# Patient Record
Sex: MALE | Race: WHITE | NOT HISPANIC OR LATINO | Employment: OTHER | URBAN - METROPOLITAN AREA
[De-identification: names, ages, dates, MRNs, and addresses within clinical notes are randomized per-mention and may not be internally consistent; named-entity substitution may affect disease eponyms.]

---

## 2023-11-09 ENCOUNTER — TELEPHONE (OUTPATIENT)
Dept: ORTHOPEDIC SURGERY | Facility: CLINIC | Age: 48
End: 2023-11-09
Payer: COMMERCIAL

## 2023-11-13 ENCOUNTER — APPOINTMENT (OUTPATIENT)
Dept: ORTHOPEDIC SURGERY | Facility: CLINIC | Age: 48
End: 2023-11-13
Payer: COMMERCIAL

## 2023-11-15 ENCOUNTER — APPOINTMENT (OUTPATIENT)
Dept: ORTHOPEDIC SURGERY | Facility: CLINIC | Age: 48
End: 2023-11-15
Payer: COMMERCIAL

## 2024-03-04 ENCOUNTER — OFFICE VISIT (OUTPATIENT)
Dept: PRIMARY CARE | Facility: CLINIC | Age: 49
End: 2024-03-04
Payer: COMMERCIAL

## 2024-03-04 ENCOUNTER — LAB (OUTPATIENT)
Dept: LAB | Facility: LAB | Age: 49
End: 2024-03-04
Payer: COMMERCIAL

## 2024-03-04 VITALS
TEMPERATURE: 97.7 F | WEIGHT: 249 LBS | DIASTOLIC BLOOD PRESSURE: 86 MMHG | BODY MASS INDEX: 36.88 KG/M2 | SYSTOLIC BLOOD PRESSURE: 154 MMHG | RESPIRATION RATE: 16 BRPM | HEART RATE: 76 BPM | OXYGEN SATURATION: 96 % | HEIGHT: 69 IN

## 2024-03-04 DIAGNOSIS — E78.5 DYSLIPIDEMIA: ICD-10-CM

## 2024-03-04 DIAGNOSIS — Z11.59 NEED FOR HEPATITIS C SCREENING TEST: ICD-10-CM

## 2024-03-04 DIAGNOSIS — D64.9 ANEMIA, UNSPECIFIED TYPE: Primary | ICD-10-CM

## 2024-03-04 DIAGNOSIS — F32.A DEPRESSION, UNSPECIFIED DEPRESSION TYPE: ICD-10-CM

## 2024-03-04 DIAGNOSIS — G47.00 INSOMNIA, UNSPECIFIED TYPE: ICD-10-CM

## 2024-03-04 DIAGNOSIS — I10 PRIMARY HYPERTENSION: ICD-10-CM

## 2024-03-04 DIAGNOSIS — Z12.5 PROSTATE CANCER SCREENING: ICD-10-CM

## 2024-03-04 DIAGNOSIS — Z12.11 COLON CANCER SCREENING: ICD-10-CM

## 2024-03-04 DIAGNOSIS — Z00.00 ANNUAL PHYSICAL EXAM: Primary | ICD-10-CM

## 2024-03-04 DIAGNOSIS — E89.0 HYPOTHYROIDISM FOLLOWING RADIOIODINE THERAPY: ICD-10-CM

## 2024-03-04 DIAGNOSIS — H91.90 DECREASED HEARING, UNSPECIFIED LATERALITY: ICD-10-CM

## 2024-03-04 DIAGNOSIS — D64.9 ANEMIA, UNSPECIFIED TYPE: ICD-10-CM

## 2024-03-04 DIAGNOSIS — E66.01 CLASS 2 SEVERE OBESITY DUE TO EXCESS CALORIES WITH SERIOUS COMORBIDITY AND BODY MASS INDEX (BMI) OF 36.0 TO 36.9 IN ADULT (MULTI): ICD-10-CM

## 2024-03-04 PROBLEM — G89.29 CHRONIC RIGHT SHOULDER PAIN: Status: ACTIVE | Noted: 2017-10-06

## 2024-03-04 PROBLEM — G89.4 PAIN SYNDROME, CHRONIC: Status: ACTIVE | Noted: 2024-03-04

## 2024-03-04 PROBLEM — M19.049 LOCALIZED, PRIMARY OSTEOARTHRITIS OF HAND: Status: ACTIVE | Noted: 2024-03-04

## 2024-03-04 PROBLEM — M54.2 CERVICALGIA: Status: ACTIVE | Noted: 2024-03-04

## 2024-03-04 PROBLEM — M89.8X1 SHOULDER BLADE PAIN: Status: ACTIVE | Noted: 2024-03-04

## 2024-03-04 PROBLEM — M89.8X1 SHOULDER BLADE PAIN: Status: RESOLVED | Noted: 2024-03-04 | Resolved: 2024-03-04

## 2024-03-04 PROBLEM — M54.42 CHRONIC BILATERAL LOW BACK PAIN WITH BILATERAL SCIATICA: Status: ACTIVE | Noted: 2017-10-06

## 2024-03-04 PROBLEM — G62.9 PERIPHERAL NEUROPATHY: Status: ACTIVE | Noted: 2024-03-04

## 2024-03-04 PROBLEM — M54.41 CHRONIC BILATERAL LOW BACK PAIN WITH BILATERAL SCIATICA: Status: ACTIVE | Noted: 2017-10-06

## 2024-03-04 PROBLEM — R53.83 FATIGUE: Status: ACTIVE | Noted: 2024-03-04

## 2024-03-04 PROBLEM — G89.29 CHRONIC BILATERAL LOW BACK PAIN WITH BILATERAL SCIATICA: Status: ACTIVE | Noted: 2017-10-06

## 2024-03-04 PROBLEM — M79.642 LEFT HAND PAIN: Status: ACTIVE | Noted: 2024-03-04

## 2024-03-04 PROBLEM — M25.511 CHRONIC RIGHT SHOULDER PAIN: Status: ACTIVE | Noted: 2017-10-06

## 2024-03-04 LAB
ALT SERPL W P-5'-P-CCNC: 16 U/L (ref 10–52)
ANION GAP SERPL CALC-SCNC: 11 MMOL/L (ref 10–20)
APPEARANCE UR: CLEAR
AST SERPL W P-5'-P-CCNC: 14 U/L (ref 9–39)
BILIRUB UR STRIP.AUTO-MCNC: NEGATIVE MG/DL
BUN SERPL-MCNC: 18 MG/DL (ref 6–23)
CALCIUM SERPL-MCNC: 9.9 MG/DL (ref 8.6–10.6)
CHLORIDE SERPL-SCNC: 103 MMOL/L (ref 98–107)
CHOLEST SERPL-MCNC: 238 MG/DL (ref 0–199)
CHOLESTEROL/HDL RATIO: 5.2
CO2 SERPL-SCNC: 32 MMOL/L (ref 21–32)
COLOR UR: NORMAL
CREAT SERPL-MCNC: 1.19 MG/DL (ref 0.5–1.3)
EGFRCR SERPLBLD CKD-EPI 2021: 74 ML/MIN/1.73M*2
ERYTHROCYTE [DISTWIDTH] IN BLOOD BY AUTOMATED COUNT: 12.6 % (ref 11.5–14.5)
FERRITIN SERPL-MCNC: 306 NG/ML (ref 20–300)
FOLATE SERPL-MCNC: 12.2 NG/ML
GLUCOSE SERPL-MCNC: 84 MG/DL (ref 74–99)
GLUCOSE UR STRIP.AUTO-MCNC: NORMAL MG/DL
HCT VFR BLD AUTO: 40.9 % (ref 41–52)
HCV AB SER QL: NONREACTIVE
HDLC SERPL-MCNC: 45.9 MG/DL
HGB BLD-MCNC: 13.2 G/DL (ref 13.5–17.5)
IRON SATN MFR SERPL: 25 % (ref 25–45)
IRON SERPL-MCNC: 107 UG/DL (ref 35–150)
KETONES UR STRIP.AUTO-MCNC: NEGATIVE MG/DL
LDLC SERPL CALC-MCNC: ABNORMAL MG/DL
LEUKOCYTE ESTERASE UR QL STRIP.AUTO: NEGATIVE
MCH RBC QN AUTO: 29.8 PG (ref 26–34)
MCHC RBC AUTO-ENTMCNC: 32.3 G/DL (ref 32–36)
MCV RBC AUTO: 92 FL (ref 80–100)
NITRITE UR QL STRIP.AUTO: NEGATIVE
NON HDL CHOLESTEROL: 192 MG/DL (ref 0–149)
NRBC BLD-RTO: 0 /100 WBCS (ref 0–0)
PH UR STRIP.AUTO: 6 [PH]
PLATELET # BLD AUTO: 225 X10*3/UL (ref 150–450)
POTASSIUM SERPL-SCNC: 4.4 MMOL/L (ref 3.5–5.3)
PROT UR STRIP.AUTO-MCNC: NEGATIVE MG/DL
PSA SERPL-MCNC: 0.28 NG/ML
RBC # BLD AUTO: 4.43 X10*6/UL (ref 4.5–5.9)
RBC # UR STRIP.AUTO: NEGATIVE /UL
SODIUM SERPL-SCNC: 142 MMOL/L (ref 136–145)
SP GR UR STRIP.AUTO: 1.02
TIBC SERPL-MCNC: 425 UG/DL (ref 240–445)
TRANSFERRIN SERPL-MCNC: 317 MG/DL (ref 200–360)
TRIGL SERPL-MCNC: 417 MG/DL (ref 0–149)
TSH SERPL-ACNC: 0.32 MIU/L (ref 0.44–3.98)
UIBC SERPL-MCNC: 318 UG/DL (ref 110–370)
UROBILINOGEN UR STRIP.AUTO-MCNC: NORMAL MG/DL
VIT B12 SERPL-MCNC: 326 PG/ML (ref 211–911)
VLDL: ABNORMAL
WBC # BLD AUTO: 6.5 X10*3/UL (ref 4.4–11.3)

## 2024-03-04 PROCEDURE — 84443 ASSAY THYROID STIM HORMONE: CPT

## 2024-03-04 PROCEDURE — 82607 VITAMIN B-12: CPT

## 2024-03-04 PROCEDURE — 82728 ASSAY OF FERRITIN: CPT

## 2024-03-04 PROCEDURE — 99386 PREV VISIT NEW AGE 40-64: CPT | Performed by: FAMILY MEDICINE

## 2024-03-04 PROCEDURE — 82746 ASSAY OF FOLIC ACID SERUM: CPT

## 2024-03-04 PROCEDURE — 83550 IRON BINDING TEST: CPT

## 2024-03-04 PROCEDURE — 3077F SYST BP >= 140 MM HG: CPT | Performed by: FAMILY MEDICINE

## 2024-03-04 PROCEDURE — 84450 TRANSFERASE (AST) (SGOT): CPT

## 2024-03-04 PROCEDURE — 83540 ASSAY OF IRON: CPT

## 2024-03-04 PROCEDURE — 84466 ASSAY OF TRANSFERRIN: CPT

## 2024-03-04 PROCEDURE — 84153 ASSAY OF PSA TOTAL: CPT

## 2024-03-04 PROCEDURE — 81003 URINALYSIS AUTO W/O SCOPE: CPT

## 2024-03-04 PROCEDURE — 3008F BODY MASS INDEX DOCD: CPT | Performed by: FAMILY MEDICINE

## 2024-03-04 PROCEDURE — 99204 OFFICE O/P NEW MOD 45 MIN: CPT | Performed by: FAMILY MEDICINE

## 2024-03-04 PROCEDURE — 86803 HEPATITIS C AB TEST: CPT

## 2024-03-04 PROCEDURE — 85027 COMPLETE CBC AUTOMATED: CPT

## 2024-03-04 PROCEDURE — 84460 ALANINE AMINO (ALT) (SGPT): CPT

## 2024-03-04 PROCEDURE — 1036F TOBACCO NON-USER: CPT | Performed by: FAMILY MEDICINE

## 2024-03-04 PROCEDURE — 80061 LIPID PANEL: CPT

## 2024-03-04 PROCEDURE — 3079F DIAST BP 80-89 MM HG: CPT | Performed by: FAMILY MEDICINE

## 2024-03-04 PROCEDURE — 36415 COLL VENOUS BLD VENIPUNCTURE: CPT

## 2024-03-04 PROCEDURE — 80048 BASIC METABOLIC PNL TOTAL CA: CPT

## 2024-03-04 RX ORDER — AMLODIPINE BESYLATE 5 MG/1
5 TABLET ORAL DAILY
Qty: 90 TABLET | Refills: 1 | Status: SHIPPED | OUTPATIENT
Start: 2024-03-04

## 2024-03-04 RX ORDER — ATORVASTATIN CALCIUM 40 MG/1
1 TABLET, FILM COATED ORAL NIGHTLY
COMMUNITY
Start: 2016-09-01 | End: 2024-03-10 | Stop reason: SDUPTHER

## 2024-03-04 RX ORDER — LISINOPRIL 40 MG/1
TABLET ORAL
COMMUNITY
Start: 2023-11-28 | End: 2024-03-04 | Stop reason: SDUPTHER

## 2024-03-04 RX ORDER — BUPROPION HYDROCHLORIDE 300 MG/1
TABLET ORAL
COMMUNITY
Start: 2024-01-11

## 2024-03-04 RX ORDER — TRAZODONE HYDROCHLORIDE 100 MG/1
100 TABLET ORAL 2 TIMES DAILY
COMMUNITY
Start: 2024-01-16

## 2024-03-04 RX ORDER — VIT C/E/ZN/COPPR/LUTEIN/ZEAXAN 250MG-90MG
1 CAPSULE ORAL DAILY
COMMUNITY
Start: 2023-12-14

## 2024-03-04 RX ORDER — LISINOPRIL 40 MG/1
40 TABLET ORAL DAILY
Qty: 90 TABLET | Refills: 1 | Status: SHIPPED | OUTPATIENT
Start: 2024-03-04

## 2024-03-04 RX ORDER — LEVOTHYROXINE SODIUM 137 UG/1
1 TABLET ORAL
COMMUNITY
Start: 2023-08-22 | End: 2024-03-28 | Stop reason: SDUPTHER

## 2024-03-04 ASSESSMENT — PATIENT HEALTH QUESTIONNAIRE - PHQ9
SUM OF ALL RESPONSES TO PHQ9 QUESTIONS 1 AND 2: 0
1. LITTLE INTEREST OR PLEASURE IN DOING THINGS: NOT AT ALL
2. FEELING DOWN, DEPRESSED OR HOPELESS: NOT AT ALL

## 2024-03-04 NOTE — PATIENT INSTRUCTIONS
Fasting labs.  Lisinopril refilled.  Amlodipine started for blood pressure.  Will refill or adjust Atorvastatin after reviewing lab results.  Cologuard ordered.  Audiology referral as requested.  Follow up with endocrine and psych as directed.    Hypertension recommendations:  DASH diet.  Decrease sodium intake to <1,500 mg/day.  Recommend weight loss efforts (see www.yourweightmatters.org/category/nutrition for ideas).  Recommend exercising (brisk walking, jogging, swimming, bicycling) 30 minutes/day, 5 days/week.  Stop smoking (if applicable).  Call Tobacco Quit Line (5-196-QUIT-NOW) for resources and support.  Decrease alcohol intake (if applicable).     F/U 2-3 weeks: BP check.

## 2024-03-04 NOTE — PROGRESS NOTES
"Subjective   Patient ID: Thomas Malloy is a 49 y.o. male who presents for Annual Exam.    HPI   Initial visit.    Patient's health is described as fair.  Regular dental visits: No (dentures).    Corrective lenses: Yes.  Vision problems: No.  Last eye exam within 1 year: No.  Hearing loss: Yes.  Requests audiology referral: Yes.  Immunizations up to date: No (declines shingrix).  Healthy diet: No.  Regular exercise: No.  Trying to lose weight: Yes.  Requests nutrition/weight loss referral: No.  Sexually active: Yes.  Using contraception: No.  Requests STD screening: No.  Colon cancer screening up to date: No (prefers cologuard)  Lung cancer screening up to date: N/A.  Hepatitis C screening up to date: No.    H/O HTN.  Taking med(s) as directed.  Requests refills.    H/O HLD.  Out of Atorvastatin x 9 days.  Requests refills.    H/O Hypothyroidism.  Tx by endo.  Last visit 2022, but states endo has been providing meds despite no office visits.  Last TSH low (done 2022).  Wants order for TSH so endo can have results.    H/O Depression, Insomnia.  Tx by psych.    Review of Systems  No other complaints.     Objective   /86   Pulse 76   Temp 36.5 °C (97.7 °F)   Resp 16   Ht 1.753 m (5' 9\")   Wt 113 kg (249 lb)   SpO2 96%   BMI 36.77 kg/m²     Physical Exam  Constitutional:       General: He is not in acute distress.     Appearance: He is obese.   HENT:      Head: Normocephalic.      Right Ear: Tympanic membrane normal.      Left Ear: Tympanic membrane normal.      Mouth/Throat:      Pharynx: Oropharynx is clear. No oropharyngeal exudate or posterior oropharyngeal erythema.   Eyes:      Extraocular Movements: Extraocular movements intact.      Conjunctiva/sclera: Conjunctivae normal.      Pupils: Pupils are equal, round, and reactive to light.   Neck:      Vascular: No carotid bruit.   Cardiovascular:      Rate and Rhythm: Normal rate and regular rhythm.      Heart sounds: Normal heart sounds. No murmur " heard.     No friction rub. No gallop.   Pulmonary:      Effort: Pulmonary effort is normal.      Breath sounds: Normal breath sounds. No wheezing, rhonchi or rales.   Abdominal:      General: Bowel sounds are normal. There is no distension.      Palpations: Abdomen is soft. There is no mass.      Tenderness: There is no abdominal tenderness. There is no guarding or rebound.   Genitourinary:     Comments: Declined prostate exam  Lymphadenopathy:      Cervical: No cervical adenopathy.   Skin:     Coloration: Skin is not jaundiced or pale.   Neurological:      General: No focal deficit present.      Mental Status: He is oriented to person, place, and time.   Psychiatric:         Mood and Affect: Mood normal.         Behavior: Behavior normal.     Assessment/Plan   Diagnoses and all orders for this visit:  Annual physical exam  Primary hypertension  -     CBC; Future  -     Basic Metabolic Panel; Future  -     Urinalysis with Reflex Culture and Microscopic; Future  -     lisinopril 40 mg tablet; Take 1 tablet (40 mg) by mouth once daily.  -     amLODIPine (Norvasc) 5 mg tablet; Take 1 tablet (5 mg) by mouth once daily.  Dyslipidemia  -     Lipid Panel; Future  -     Aspartate Aminotransferase; Future  -     Alanine Aminotransferase; Future  Hypothyroidism following radioiodine therapy  -     Thyroid Stimulating Hormone; Future  -     Tx by endo  Depression, unspecified depression type        -     Tx by psych  Insomnia, unspecified type        -     Tx by psych  Decreased hearing, unspecified laterality  -     Referral to Audiology; Future  Prostate cancer screening  -     Prostate Specific Antigen, Screen; Future  Need for hepatitis C screening test  -     Hepatitis C Antibody; Future  Colon cancer screening  -     Cologuard® colon cancer screening; Future  Class 2 severe obesity due to excess calories with serious comorbidity and body mass index (BMI) of 36.0 to 36.9 in adult (CMS/[facility], located within [facility])    Fasting labs.  Lisinopril  refilled.  Amlodipine started for blood pressure.  Will refill or adjust Atorvastatin after reviewing lab results.  Cologuard ordered.  Audiology referral as requested.  Follow up with endocrine and psych as directed.    Hypertension recommendations:  DASH diet.  Decrease sodium intake to <1,500 mg/day.  Recommend weight loss efforts (see www.yourweightmatters.org/category/nutrition for ideas).  Recommend exercising (brisk walking, jogging, swimming, bicycling) 30 minutes/day, 5 days/week.  Stop smoking (if applicable).  Call Tobacco Quit Line (3-555-QUIT-NOW) for resources and support.  Decrease alcohol intake (if applicable).     F/U 2-3 weeks: BP check.

## 2024-03-05 LAB — HOLD SPECIMEN: NORMAL

## 2024-03-10 DIAGNOSIS — E78.2 MIXED HYPERLIPIDEMIA: ICD-10-CM

## 2024-03-10 DIAGNOSIS — D64.9 ANEMIA, UNSPECIFIED TYPE: Primary | ICD-10-CM

## 2024-03-10 RX ORDER — ATORVASTATIN CALCIUM 40 MG/1
40 TABLET, FILM COATED ORAL NIGHTLY
Qty: 90 TABLET | Refills: 0 | Status: SHIPPED | OUTPATIENT
Start: 2024-03-10

## 2024-03-10 RX ORDER — FENOFIBRATE 160 MG/1
160 TABLET ORAL DAILY
Qty: 90 TABLET | Refills: 0 | Status: SHIPPED | OUTPATIENT
Start: 2024-03-10

## 2024-03-18 LAB — NONINV COLON CA DNA+OCC BLD SCRN STL QL: NEGATIVE

## 2024-03-20 PROBLEM — H52.209 ASTIGMATISM: Status: ACTIVE | Noted: 2024-03-20

## 2024-03-20 RX ORDER — METHOCARBAMOL 750 MG/1
1 TABLET, FILM COATED ORAL 3 TIMES DAILY
COMMUNITY
Start: 2021-10-21 | End: 2024-05-15 | Stop reason: ALTCHOICE

## 2024-03-20 RX ORDER — QUETIAPINE FUMARATE 150 MG/1
TABLET, FILM COATED ORAL
COMMUNITY
End: 2024-05-15 | Stop reason: ALTCHOICE

## 2024-03-20 RX ORDER — DULOXETIN HYDROCHLORIDE 60 MG/1
2 CAPSULE, DELAYED RELEASE ORAL DAILY
COMMUNITY
Start: 2016-07-21 | End: 2024-05-15 | Stop reason: ALTCHOICE

## 2024-03-20 RX ORDER — MELOXICAM 15 MG/1
1 TABLET ORAL DAILY
COMMUNITY
Start: 2021-11-08 | End: 2024-05-15 | Stop reason: ALTCHOICE

## 2024-03-20 RX ORDER — LURASIDONE HYDROCHLORIDE 80 MG/1
TABLET, FILM COATED ORAL
COMMUNITY
Start: 2023-07-11 | End: 2024-05-15 | Stop reason: ALTCHOICE

## 2024-03-20 RX ORDER — IBUPROFEN 600 MG/1
TABLET ORAL EVERY 6 HOURS
COMMUNITY
Start: 2021-10-21 | End: 2024-04-22 | Stop reason: ALTCHOICE

## 2024-03-25 ENCOUNTER — OFFICE VISIT (OUTPATIENT)
Dept: PRIMARY CARE | Facility: CLINIC | Age: 49
End: 2024-03-25
Payer: COMMERCIAL

## 2024-03-25 VITALS
RESPIRATION RATE: 16 BRPM | HEART RATE: 80 BPM | DIASTOLIC BLOOD PRESSURE: 80 MMHG | WEIGHT: 235.4 LBS | OXYGEN SATURATION: 96 % | SYSTOLIC BLOOD PRESSURE: 124 MMHG | HEIGHT: 69 IN | BODY MASS INDEX: 34.87 KG/M2 | TEMPERATURE: 97.9 F

## 2024-03-25 DIAGNOSIS — I10 PRIMARY HYPERTENSION: Primary | ICD-10-CM

## 2024-03-25 DIAGNOSIS — R10.9 ABDOMINAL DISCOMFORT: ICD-10-CM

## 2024-03-25 PROCEDURE — 1036F TOBACCO NON-USER: CPT | Performed by: FAMILY MEDICINE

## 2024-03-25 PROCEDURE — 3079F DIAST BP 80-89 MM HG: CPT | Performed by: FAMILY MEDICINE

## 2024-03-25 PROCEDURE — 99214 OFFICE O/P EST MOD 30 MIN: CPT | Performed by: FAMILY MEDICINE

## 2024-03-25 PROCEDURE — 3074F SYST BP LT 130 MM HG: CPT | Performed by: FAMILY MEDICINE

## 2024-03-25 PROCEDURE — 3008F BODY MASS INDEX DOCD: CPT | Performed by: FAMILY MEDICINE

## 2024-03-25 NOTE — PATIENT INSTRUCTIONS
Continue current medications.    Consider over the counter probiotic  Consider over the counter Omeprazole vs Famotidine.  Call, send message through [employer], or return to office prn if these symptoms worsen or fail to improve as anticipated.     Repeat fasting lipids in 3 months as previously advised.    F/U 6 months: Med refills.

## 2024-03-25 NOTE — PROGRESS NOTES
"Subjective   Patient ID: Thomas Malloy is a 49 y.o. male who presents for Blood Pressure Check.    HPI   H/O HTN.  Norvasc started at last visit.  Here for BP check.  Taking med(s) as directed.  Does not need refills at this time.    Had diarrhea last week (resolved).  Has \"minute\" residual gas sensation in epigastric area.    Review of Systems  No other complaints.     Objective   /84   Pulse 80   Temp 36.6 °C (97.9 °F)   Resp 16   Ht 1.753 m (5' 9\")   Wt 107 kg (235 lb 6.4 oz)   SpO2 96%   BMI 34.76 kg/m²     Physical Exam  Constitutional:       General: He is not in acute distress.     Appearance: He is obese.   Cardiovascular:      Rate and Rhythm: Normal rate and regular rhythm.      Heart sounds: Normal heart sounds. No murmur heard.     No friction rub. No gallop.   Pulmonary:      Effort: Pulmonary effort is normal.      Breath sounds: Normal breath sounds. No wheezing, rhonchi or rales.   Abdominal:      General: Bowel sounds are normal. There is no distension.      Palpations: Abdomen is soft. There is no mass.      Tenderness: There is no abdominal tenderness. There is no guarding or rebound.   Neurological:      Mental Status: He is oriented to person, place, and time.   Psychiatric:         Mood and Affect: Mood normal.         Behavior: Behavior normal.     Assessment/Plan   Diagnoses and all orders for this visit:  Primary hypertension  Abdominal discomfort    Blood pressure at goal.  Continue current medications.    Consider over the counter probiotic.  Consider over the counter Omeprazole vs Famotidine.  Call, send message through [email or web address], or return to office prn if these symptoms worsen or fail to improve as anticipated.     Repeat fasting lipids in 3 months as previously advised.    F/U 6 months: Med refills.  "

## 2024-03-28 ENCOUNTER — OFFICE VISIT (OUTPATIENT)
Dept: ENDOCRINOLOGY | Facility: CLINIC | Age: 49
End: 2024-03-28
Payer: COMMERCIAL

## 2024-03-28 VITALS
DIASTOLIC BLOOD PRESSURE: 72 MMHG | SYSTOLIC BLOOD PRESSURE: 118 MMHG | BODY MASS INDEX: 34.7 KG/M2 | HEART RATE: 102 BPM | WEIGHT: 235 LBS

## 2024-03-28 DIAGNOSIS — E89.0 POSTABLATIVE HYPOTHYROIDISM: Primary | ICD-10-CM

## 2024-03-28 DIAGNOSIS — Z86.39 HISTORY OF GRAVES' DISEASE: ICD-10-CM

## 2024-03-28 PROCEDURE — 99203 OFFICE O/P NEW LOW 30 MIN: CPT | Performed by: NURSE PRACTITIONER

## 2024-03-28 PROCEDURE — 3074F SYST BP LT 130 MM HG: CPT | Performed by: NURSE PRACTITIONER

## 2024-03-28 PROCEDURE — 1036F TOBACCO NON-USER: CPT | Performed by: NURSE PRACTITIONER

## 2024-03-28 PROCEDURE — 3078F DIAST BP <80 MM HG: CPT | Performed by: NURSE PRACTITIONER

## 2024-03-28 PROCEDURE — 3008F BODY MASS INDEX DOCD: CPT | Performed by: NURSE PRACTITIONER

## 2024-03-28 RX ORDER — AMPICILLIN TRIHYDRATE 500 MG
25 CAPSULE ORAL DAILY
COMMUNITY
Start: 2024-03-22

## 2024-03-28 RX ORDER — LEVOTHYROXINE SODIUM 137 UG/1
137 TABLET ORAL
Qty: 90 TABLET | Refills: 3 | Status: SHIPPED | OUTPATIENT
Start: 2024-03-28

## 2024-03-28 ASSESSMENT — ENCOUNTER SYMPTOMS: DEPRESSION: 0

## 2024-03-28 ASSESSMENT — PAIN SCALES - GENERAL: PAINLEVEL: 0-NO PAIN

## 2024-03-28 NOTE — PROGRESS NOTES
"HPI   New patient presents for metabolic management/thyroid. History of Graves disease with MORE ablation \"about 7 years ago\", unsure where he had procedure done. Currently maintained on Levothyroxine 137 mcg 7 pills a week. Most recent TSH was mildly decreased, typically has been chemically euthyroid. He has no compressive/obstructive neck complaints. No eye complaints.       Current Outpatient Medications:     Vitamin D3 25 mcg (1,000 unit) capsule, Take 1 capsule (25 mcg) by mouth once daily., Disp: , Rfl:     amLODIPine (Norvasc) 5 mg tablet, Take 1 tablet (5 mg) by mouth once daily., Disp: 90 tablet, Rfl: 1    atorvastatin (Lipitor) 40 mg tablet, Take 1 tablet (40 mg) by mouth once daily at bedtime., Disp: 90 tablet, Rfl: 0    buPROPion XL (Wellbutrin XL) 300 mg 24 hr tablet, , Disp: , Rfl:     cholecalciferol (Vitamin D-3) 25 MCG (1000 UT) capsule, Take 1 capsule (25 mcg) by mouth once daily., Disp: , Rfl:     DULoxetine (Cymbalta) 60 mg DR capsule, Take 2 capsules (120 mg) by mouth once daily., Disp: , Rfl:     fenofibrate (Triglide) 160 mg tablet, Take 1 tablet (160 mg) by mouth once daily., Disp: 90 tablet, Rfl: 0    ibuprofen 600 mg tablet, Take by mouth every 6 hours., Disp: , Rfl:     levothyroxine (Synthroid, Levoxyl) 137 mcg tablet, Take 1 tablet (137 mcg) by mouth once daily in the morning. Take before meals., Disp: 90 tablet, Rfl: 3    lisinopril 40 mg tablet, Take 1 tablet (40 mg) by mouth once daily., Disp: 90 tablet, Rfl: 1    lurasidone (Latuda) 80 mg tablet, TAKE 1 TABLET BY MOUTH ONCE DAILY IN THE EVENING WITH FOOD, Disp: , Rfl:     meloxicam (Mobic) 15 mg tablet, Take 1 tablet (15 mg) by mouth once daily., Disp: , Rfl:     methocarbamol (Robaxin) 750 mg tablet, Take 1 tablet (750 mg) by mouth 3 times a day., Disp: , Rfl:     QUEtiapine 150 mg tablet, QUEtiapine Fumarate, Disp: , Rfl:     traZODone (Desyrel) 100 mg tablet, 1 tablet (100 mg) 2 times a day., Disp: , Rfl:       Allergies as of " 03/28/2024 - Reviewed 03/28/2024   Allergen Reaction Noted    Hydrocodone-acetaminophen Itching and Rash 04/22/2012    Pregabalin Hives 10/31/2017         Review of Systems   Cardiology: Lightheadedness-denies.  Chest pain-denies.  Leg edema-denies.  Palpitations-denies.  Respiratory: Cough-denies. Shortness of breath-denies.  Wheezing-denies.  Gastroenterology: Constipation-denies.  Diarrhea-denies.  Heartburn-denies.  Endocrinology: Cold intolerance-denies.  Heat intolerance-denies.  Sweats-denies.  Neurology: Headache-denies.  Tremor-denies.  Neuropathy in extremities-denies.  Psychology: Low energy-denies.  Irritability-denies.  Sleep disturbances-denies.      /72 (BP Location: Left arm, Patient Position: Sitting)   Pulse 102   Wt 107 kg (235 lb)   BMI 34.70 kg/m²       Labs:  Lab Results   Component Value Date    WBC 6.5 03/04/2024    NRBC 0.0 03/04/2024    RBC 4.43 (L) 03/04/2024    HGB 13.2 (L) 03/04/2024    HCT 40.9 (L) 03/04/2024     03/04/2024     Lab Results   Component Value Date    CALCIUM 9.9 03/04/2024    AST 14 03/04/2024    ALKPHOS 47 03/18/2021    BILITOT 0.4 03/18/2021    PROT 7.8 03/18/2021    ALBUMIN 5.0 03/18/2021     03/04/2024    K 4.4 03/04/2024     03/04/2024    CO2 32 03/04/2024    ANIONGAP 11 03/04/2024    BUN 18 03/04/2024    CREATININE 1.19 03/04/2024    GLUCOSE 84 03/04/2024    ALT 16 03/04/2024    EGFR 74 03/04/2024     Lab Results   Component Value Date    CHOL 238 (H) 03/04/2024    TRIG 417 (H) 03/04/2024    HDL 45.9 03/04/2024    LDLCALC  03/04/2024      Comment:      The calculation of LDL and VLDL are inaccurate when the Triglycerides are greater than 400 mg/dL or when the patient is non-fasting. If LDL measurement is necessary contact the testing laboratory for an alternative LDL assay.                                  Near   Borderline      AGE      Desirable  Optimal    High     High     Very High     0-19 Y     0 - 109     ---    110-129   >/= 130      ----    20-24 Y     0 - 119     ---    120-159   >/= 160     ----      >24 Y     0 -  99   100-129  130-159   160-189     >/=190         Lab Results   Component Value Date    TSH 0.32 (L) 03/04/2024     Lab Results   Component Value Date    HCIFOQMB37 326 03/04/2024     Lab Results   Component Value Date    HGBA1C 5.4 03/04/2020         Physical Exam   General Appearance: pleasant, cooperative, no acute distress  HEENT: no chemosis, no proptosis, no lid lag, no lid retraction  Neck: no lymphadenopathy, no thyromegaly, no dominant thyroid nodules  Heart: no murmurs, regular rate and rhythm, S1 and S2  Lungs: no wheezes, no rhonci, no rales  Extremities: no lower extremity swelling      Assessment/Plan   1. Postablative hypothyroidism  -recheck TSH in 4 weeks may need dose adjusted to 6 pills a week  -can follow up in 1 year with Endo for neck exams/lab/refills    - TSH with reflex to Free T4 if abnormal; Future  - levothyroxine (Synthroid, Levoxyl) 137 mcg tablet; Take 1 tablet (137 mcg) by mouth once daily in the morning. Take before meals.  Dispense: 90 tablet; Refill: 3    2. History of Graves' disease  -2012 31.3 mCi I-131       Follow Up:1 year CNP    -labs/tests/notes reviewed  -reviewed and counseled patient on medication monitoring and side effects

## 2024-04-22 ENCOUNTER — OFFICE VISIT (OUTPATIENT)
Dept: HEMATOLOGY/ONCOLOGY | Facility: CLINIC | Age: 49
End: 2024-04-22
Payer: COMMERCIAL

## 2024-04-22 VITALS
OXYGEN SATURATION: 96 % | HEART RATE: 90 BPM | WEIGHT: 235.45 LBS | TEMPERATURE: 97.9 F | DIASTOLIC BLOOD PRESSURE: 98 MMHG | SYSTOLIC BLOOD PRESSURE: 145 MMHG | RESPIRATION RATE: 16 BRPM | BODY MASS INDEX: 34.77 KG/M2

## 2024-04-22 DIAGNOSIS — D64.9 ANEMIA, UNSPECIFIED TYPE: ICD-10-CM

## 2024-04-22 DIAGNOSIS — E89.0 POSTABLATIVE HYPOTHYROIDISM: Primary | ICD-10-CM

## 2024-04-22 DIAGNOSIS — E89.0 POSTABLATIVE HYPOTHYROIDISM: ICD-10-CM

## 2024-04-22 LAB
BASOPHILS # BLD AUTO: 0.02 X10*3/UL (ref 0–0.1)
BASOPHILS NFR BLD AUTO: 0.4 %
EOSINOPHIL # BLD AUTO: 0.15 X10*3/UL (ref 0–0.7)
EOSINOPHIL NFR BLD AUTO: 2.8 %
ERYTHROCYTE [DISTWIDTH] IN BLOOD BY AUTOMATED COUNT: 12.1 % (ref 11.5–14.5)
FERRITIN SERPL-MCNC: 340 NG/ML (ref 20–300)
HCT VFR BLD AUTO: 40.7 % (ref 41–52)
HGB BLD-MCNC: 13.5 G/DL (ref 13.5–17.5)
IMM GRANULOCYTES # BLD AUTO: 0.01 X10*3/UL (ref 0–0.7)
IMM GRANULOCYTES NFR BLD AUTO: 0.2 % (ref 0–0.9)
IRON SATN MFR SERPL: 20 % (ref 25–45)
IRON SERPL-MCNC: 113 UG/DL (ref 35–150)
LYMPHOCYTES # BLD AUTO: 1.19 X10*3/UL (ref 1.2–4.8)
LYMPHOCYTES NFR BLD AUTO: 22 %
MCH RBC QN AUTO: 30.1 PG (ref 26–34)
MCHC RBC AUTO-ENTMCNC: 33.2 G/DL (ref 32–36)
MCV RBC AUTO: 91 FL (ref 80–100)
MONOCYTES # BLD AUTO: 0.53 X10*3/UL (ref 0.1–1)
MONOCYTES NFR BLD AUTO: 9.8 %
NEUTROPHILS # BLD AUTO: 3.52 X10*3/UL (ref 1.2–7.7)
NEUTROPHILS NFR BLD AUTO: 64.8 %
PLATELET # BLD AUTO: 259 X10*3/UL (ref 150–450)
RBC # BLD AUTO: 4.48 X10*6/UL (ref 4.5–5.9)
T4 FREE SERPL-MCNC: 1.11 NG/DL (ref 0.61–1.12)
TIBC SERPL-MCNC: 562 UG/DL (ref 240–445)
TSH SERPL-ACNC: 0.33 MIU/L (ref 0.44–3.98)
UIBC SERPL-MCNC: 449 UG/DL (ref 110–370)
WBC # BLD AUTO: 5.4 X10*3/UL (ref 4.4–11.3)

## 2024-04-22 PROCEDURE — 82728 ASSAY OF FERRITIN: CPT | Performed by: NURSE PRACTITIONER

## 2024-04-22 PROCEDURE — 3080F DIAST BP >= 90 MM HG: CPT | Performed by: NURSE PRACTITIONER

## 2024-04-22 PROCEDURE — 84439 ASSAY OF FREE THYROXINE: CPT | Performed by: NURSE PRACTITIONER

## 2024-04-22 PROCEDURE — 3008F BODY MASS INDEX DOCD: CPT | Performed by: NURSE PRACTITIONER

## 2024-04-22 PROCEDURE — 83540 ASSAY OF IRON: CPT | Performed by: NURSE PRACTITIONER

## 2024-04-22 PROCEDURE — 81256 HFE GENE: CPT | Performed by: NURSE PRACTITIONER

## 2024-04-22 PROCEDURE — 85025 COMPLETE CBC W/AUTO DIFF WBC: CPT | Performed by: NURSE PRACTITIONER

## 2024-04-22 PROCEDURE — 3077F SYST BP >= 140 MM HG: CPT | Performed by: NURSE PRACTITIONER

## 2024-04-22 PROCEDURE — 99204 OFFICE O/P NEW MOD 45 MIN: CPT | Performed by: NURSE PRACTITIONER

## 2024-04-22 PROCEDURE — 99214 OFFICE O/P EST MOD 30 MIN: CPT | Performed by: NURSE PRACTITIONER

## 2024-04-22 PROCEDURE — 36415 COLL VENOUS BLD VENIPUNCTURE: CPT | Performed by: NURSE PRACTITIONER

## 2024-04-22 PROCEDURE — 84443 ASSAY THYROID STIM HORMONE: CPT | Performed by: NURSE PRACTITIONER

## 2024-04-22 ASSESSMENT — ENCOUNTER SYMPTOMS
PSYCHIATRIC NEGATIVE: 1
CARDIOVASCULAR NEGATIVE: 1
RESPIRATORY NEGATIVE: 1
GASTROINTESTINAL NEGATIVE: 1
MUSCULOSKELETAL NEGATIVE: 1
EYES NEGATIVE: 1
OCCASIONAL FEELINGS OF UNSTEADINESS: 0
DEPRESSION: 0
HEMATOLOGIC/LYMPHATIC NEGATIVE: 1
NEUROLOGICAL NEGATIVE: 1
CONSTITUTIONAL NEGATIVE: 1
LOSS OF SENSATION IN FEET: 0

## 2024-04-22 ASSESSMENT — PAIN SCALES - GENERAL: PAINLEVEL: 0-NO PAIN

## 2024-04-22 NOTE — PROGRESS NOTES
Patient ID: Thomas Baptiste is a 49 y.o. male.  Referring Physician: Cody Hines MD  [address] [facility], [address]  [place] [address]  Primary Care Provider: Cody Hines MD  Visit Type: Initial Visit      Subjective    HPI  48 yo referred for ferritan 306 done 3/4/24.  Other labs include WBC 6.5, hgb 13.2, plt 25 with BUN/crt 18/1.19, iron 107, % sat 25 and B12 of 326.  He has history of Graves disease and is on appropriate management for that.  HCV negative.   He does not take iron supplements.  He does not have family history of anyone needing phlebotomies.   He was concerned that he might have cancer.  His father is  of kidney cancer 52yo.  Only other family history is grandmother with breast cancer older age.  He did cologuard which was negative.     Review of Systems   Constitutional: Negative.    HENT:  Negative.     Eyes: Negative.    Respiratory: Negative.     Cardiovascular: Negative.    Gastrointestinal: Negative.    Genitourinary: Negative.     Musculoskeletal: Negative.    Skin: Negative.    Neurological: Negative.    Hematological: Negative.    Psychiatric/Behavioral: Negative.         Objective   BSA: 2.28 meters squared  BP (!) 145/98 (BP Location: Left arm)   Pulse 90   Temp 36.6 °C (97.9 °F)   Resp 16   Wt 107 kg (235 lb 7.2 oz)   SpO2 96%   BMI 34.77 kg/m²      has a past medical history of Cervical root disorders, not elsewhere classified, Hypertension (3 yrs ago), Hyperthyroidism (5 yrs ago), Hypothyroidism (5 yrs ago), Other conditions influencing health status, Other specified health status, and Vitamin D deficiency (3 yrs ago).   has a past surgical history that includes Hernia repair (Bilateral).  Family History   Problem Relation Name Age of Onset    Hypertension Father George baptiste     Heart failure Father George baptiste     Kidney cancer Father George baptiste     Hypertension Brother           Thomas Baptiste  reports that he  has never smoked. He has never been exposed to tobacco smoke. He has never used smokeless tobacco.  He  reports that he does not currently use alcohol.  He  reports current drug use. Drug: Marijuana.  Owns his own company, [employer].     Physical Exam  Constitutional:       Appearance: Normal appearance. He is normal weight.   Eyes:      Conjunctiva/sclera: Conjunctivae normal.      Pupils: Pupils are equal, round, and reactive to light.   Cardiovascular:      Rate and Rhythm: Normal rate and regular rhythm.      Pulses: Normal pulses.      Heart sounds: Normal heart sounds.   Pulmonary:      Effort: Pulmonary effort is normal.      Breath sounds: Normal breath sounds.   Abdominal:      General: Abdomen is flat.      Palpations: Abdomen is soft.      Comments: No hepatomegaly   Musculoskeletal:         General: Normal range of motion.      Cervical back: Normal range of motion.   Skin:     General: Skin is dry.      Coloration: Skin is not pale.   Neurological:      General: No focal deficit present.     WBC   Date/Time Value Ref Range Status   04/22/2024 09:35 AM 5.4 4.4 - 11.3 x10*3/uL Final   03/04/2024 02:14 PM 6.5 4.4 - 11.3 x10*3/uL Final   03/18/2021 03:22 PM 5.6 4.4 - 11.3 x10E9/L Final   03/04/2020 04:31 PM 8.9 4.4 - 11.3 x10E9/L Final   10/09/2017 02:54 PM 7.7 4.4 - 11.3 x10E9/L Final     nRBC   Date Value Ref Range Status   03/04/2024 0.0 0.0 - 0.0 /100 WBCs Final   03/18/2021 0.0 0.0 - 0.0 /100 WBC Final   03/04/2020 0.0 0.0 - 0.0 /100 WBC Final   10/09/2017 0.0 0.0 - 0.0 /100 WBC Final     RBC   Date Value Ref Range Status   04/22/2024 4.48 (L) 4.50 - 5.90 x10*6/uL Final   03/04/2024 4.43 (L) 4.50 - 5.90 x10*6/uL Final   03/18/2021 4.25 (L) 4.50 - 5.90 x10E12/L Final   03/04/2020 4.61 4.50 - 5.90 x10E12/L Final   10/09/2017 4.63 4.50 - 5.90 x10E12/L Final     Hemoglobin   Date Value Ref Range Status   04/22/2024 13.5 13.5 - 17.5 g/dL Final   03/04/2024 13.2 (L) 13.5 - 17.5 g/dL Final  "  03/18/2021 13.1 (L) 13.5 - 17.5 g/dL Final   03/04/2020 14.7 13.5 - 17.5 g/dL Final   10/09/2017 13.7 13.5 - 17.5 g/dL Final     Hematocrit   Date Value Ref Range Status   04/22/2024 40.7 (L) 41.0 - 52.0 % Final   03/04/2024 40.9 (L) 41.0 - 52.0 % Final   03/18/2021 38.4 (L) 41.0 - 52.0 % Final   03/04/2020 43.4 41.0 - 52.0 % Final   10/09/2017 42.0 41.0 - 52.0 % Final     MCV   Date/Time Value Ref Range Status   04/22/2024 09:35 AM 91 80 - 100 fL Final   03/04/2024 02:14 PM 92 80 - 100 fL Final   03/18/2021 03:22 PM 90 80 - 100 fL Final   03/04/2020 04:31 PM 94 80 - 100 fL Final   10/09/2017 02:54 PM 91 80 - 100 fL Final     MCH   Date/Time Value Ref Range Status   04/22/2024 09:35 AM 30.1 26.0 - 34.0 pg Final   03/04/2024 02:14 PM 29.8 26.0 - 34.0 pg Final     MCHC   Date/Time Value Ref Range Status   04/22/2024 09:35 AM 33.2 32.0 - 36.0 g/dL Final   03/04/2024 02:14 PM 32.3 32.0 - 36.0 g/dL Final   03/18/2021 03:22 PM 34.1 32.0 - 36.0 g/dL Final   03/04/2020 04:31 PM 33.9 32.0 - 36.0 g/dL Final   10/09/2017 02:54 PM 32.6 32.0 - 36.0 g/dL Final     RDW   Date/Time Value Ref Range Status   04/22/2024 09:35 AM 12.1 11.5 - 14.5 % Final   03/04/2024 02:14 PM 12.6 11.5 - 14.5 % Final   03/18/2021 03:22 PM 12.1 11.5 - 14.5 % Final   03/04/2020 04:31 PM 12.8 11.5 - 14.5 % Final   10/09/2017 02:54 PM 13.2 11.5 - 14.5 % Final     Platelets   Date/Time Value Ref Range Status   04/22/2024 09:35  150 - 450 x10*3/uL Final   03/04/2024 02:14  150 - 450 x10*3/uL Final   03/18/2021 03:22  150 - 450 x10E9/L Final   03/04/2020 04:31  150 - 450 x10E9/L Final   10/09/2017 02:54  150 - 450 x10E9/L Final     No results found for: \"MPV\"  Neutrophils %   Date/Time Value Ref Range Status   04/22/2024 09:35 AM 64.8 40.0 - 80.0 % Final   03/04/2020 04:31 PM 65.5 40.0 - 80.0 % Final   10/09/2017 02:54 PM 62.3 40.0 - 80.0 % Final     Immature Granulocytes %, Automated   Date/Time Value Ref Range Status "   04/22/2024 09:35 AM 0.2 0.0 - 0.9 % Final     Comment:     Immature Granulocyte Count (IG) includes promyelocytes, myelocytes and metamyelocytes but does not include bands. Percent differential counts (%) should be interpreted in the context of the absolute cell counts (cells/UL).   03/04/2020 04:31 PM 0.2 0.0 - 0.9 % Final     Comment:      Immature Granulocyte Count (IG) includes promyelocytes,    myelocytes and metamyelocytes but does not include bands.   Percent differential counts (%) should be interpreted in the   context of the absolute cell counts (cells/L).     10/09/2017 02:54 PM 0.4 0.0 - 0.9 % Final     Comment:      Percent differential counts (%) should be interpreted in the   context of the absolute cell counts (cells/L).       Lymphocytes %   Date/Time Value Ref Range Status   04/22/2024 09:35 AM 22.0 13.0 - 44.0 % Final   03/04/2020 04:31 PM 22.5 13.0 - 44.0 % Final   10/09/2017 02:54 PM 25.4 13.0 - 44.0 % Final     Monocytes %   Date/Time Value Ref Range Status   04/22/2024 09:35 AM 9.8 2.0 - 10.0 % Final   03/04/2020 04:31 PM 8.3 2.0 - 10.0 % Final   10/09/2017 02:54 PM 8.3 2.0 - 10.0 % Final     Eosinophils %   Date/Time Value Ref Range Status   04/22/2024 09:35 AM 2.8 0.0 - 6.0 % Final   03/04/2020 04:31 PM 2.8 0.0 - 6.0 % Final   10/09/2017 02:54 PM 2.8 0.0 - 6.0 % Final     Basophils %   Date/Time Value Ref Range Status   04/22/2024 09:35 AM 0.4 0.0 - 2.0 % Final   03/04/2020 04:31 PM 0.7 0.0 - 2.0 % Final   10/09/2017 02:54 PM 0.8 0.0 - 2.0 % Final     Neutrophils Absolute   Date/Time Value Ref Range Status   04/22/2024 09:35 AM 3.52 1.20 - 7.70 x10*3/uL Final     Comment:     Percent differential counts (%) should be interpreted in the context of the absolute cell counts (cells/uL).   03/04/2020 04:31 PM 5.86 1.20 - 7.70 x10E9/L Final   10/09/2017 02:54 PM 4.82 1.20 - 7.70 x10E9/L Final     Immature Granulocytes Absolute, Automated   Date/Time Value Ref Range Status   04/22/2024 09:35 AM  0.01 0.00 - 0.70 x10*3/uL Final     Lymphocytes Absolute   Date/Time Value Ref Range Status   04/22/2024 09:35 AM 1.19 (L) 1.20 - 4.80 x10*3/uL Final   03/04/2020 04:31 PM 2.01 1.20 - 4.80 x10E9/L Final   10/09/2017 02:54 PM 1.96 1.20 - 4.80 x10E9/L Final     Monocytes Absolute   Date/Time Value Ref Range Status   04/22/2024 09:35 AM 0.53 0.10 - 1.00 x10*3/uL Final   03/04/2020 04:31 PM 0.74 0.10 - 1.00 x10E9/L Final   10/09/2017 02:54 PM 0.64 0.10 - 1.00 x10E9/L Final     Eosinophils Absolute   Date/Time Value Ref Range Status   04/22/2024 09:35 AM 0.15 0.00 - 0.70 x10*3/uL Final   03/04/2020 04:31 PM 0.25 0.00 - 0.70 x10E9/L Final   10/09/2017 02:54 PM 0.22 0.00 - 0.70 x10E9/L Final     Basophils Absolute   Date/Time Value Ref Range Status   04/22/2024 09:35 AM 0.02 0.00 - 0.10 x10*3/uL Final   03/04/2020 04:31 PM 0.06 0.00 - 0.10 x10E9/L Final   10/09/2017 02:54 PM 0.06 0.00 - 0.10 x10E9/L Final         Assessment/Plan    Elevated ferritan 306, not likely hemachromatosis, but will check  B12 low at 326, advised to take liquid B12 1000mcg SL every day will help fatigue and improve energy as well as improve anemia  Anemia likely related to low B12  Father with kidney cancer 52yo, no other significant family history    OV 3 weeks to review results     Diagnoses and all orders for this visit:  Anemia, unspecified type  -     Referral to Hematology and Oncology  -     CBC and Auto Differential; Future  -     Ferritin; Future  -     Iron and TIBC; Future  -     Hemochromatosis Mutation Analysis; Future  -     Clinic Appointment Request Follow up; Future  Postablative hypothyroidism  -     TSH with reflex to Free T4 if abnormal           Nahomi Cunningham PA-C

## 2024-04-29 LAB
ELECTRONICALLY SIGNED BY: NORMAL
HFE GENE MUT TESTED BLD/T: NORMAL
HFE P.C282Y BLD/T QL: NORMAL
HFE P.H63D BLD/T QL: NORMAL

## 2024-05-15 ENCOUNTER — OFFICE VISIT (OUTPATIENT)
Dept: HEMATOLOGY/ONCOLOGY | Facility: CLINIC | Age: 49
End: 2024-05-15
Payer: COMMERCIAL

## 2024-05-15 VITALS
RESPIRATION RATE: 16 BRPM | OXYGEN SATURATION: 97 % | HEIGHT: 69 IN | HEART RATE: 87 BPM | DIASTOLIC BLOOD PRESSURE: 87 MMHG | TEMPERATURE: 98.2 F | WEIGHT: 228.18 LBS | SYSTOLIC BLOOD PRESSURE: 143 MMHG | BODY MASS INDEX: 33.8 KG/M2

## 2024-05-15 DIAGNOSIS — Z80.51 FAMILY HISTORY OF KIDNEY CANCER: ICD-10-CM

## 2024-05-15 DIAGNOSIS — D64.9 ANEMIA, UNSPECIFIED TYPE: ICD-10-CM

## 2024-05-15 DIAGNOSIS — R14.0 ABDOMINAL BLOATING: Primary | ICD-10-CM

## 2024-05-15 PROCEDURE — 3077F SYST BP >= 140 MM HG: CPT | Performed by: NURSE PRACTITIONER

## 2024-05-15 PROCEDURE — 3079F DIAST BP 80-89 MM HG: CPT | Performed by: NURSE PRACTITIONER

## 2024-05-15 PROCEDURE — 3008F BODY MASS INDEX DOCD: CPT | Performed by: NURSE PRACTITIONER

## 2024-05-15 PROCEDURE — 99214 OFFICE O/P EST MOD 30 MIN: CPT | Performed by: NURSE PRACTITIONER

## 2024-05-15 ASSESSMENT — ENCOUNTER SYMPTOMS
RESPIRATORY NEGATIVE: 1
CONSTITUTIONAL NEGATIVE: 1
CARDIOVASCULAR NEGATIVE: 1
DIARRHEA: 1
MUSCULOSKELETAL NEGATIVE: 1
HEMATOLOGIC/LYMPHATIC NEGATIVE: 1
ROS GI COMMENTS: BLOATING
ABDOMINAL DISTENTION: 1
NEUROLOGICAL NEGATIVE: 1
ABDOMINAL PAIN: 1

## 2024-05-15 ASSESSMENT — PAIN SCALES - GENERAL: PAINLEVEL: 0-NO PAIN

## 2024-05-15 NOTE — PROGRESS NOTES
"Patient ID: Thomas Malloy is a 49 y.o. male.  Referring Physician: Nahomi Cunningham PA-C  [address]  [place] [address]  Primary Care Provider: Cody Hines MD  Visit Type: Follow Up      Subjective    HPI  48 yo referred for ferritan 306 done 3/4/24.  Other labs include WBC 6.5, hgb 13.2, with BUN/crt 18/1.19, iron 107, % sat 25 and B12 of 326.  He has history of Graves disease and is on appropriate management for that.  HCV negative.   He does not take iron supplements.  He does not have family history of anyone needing phlebotomies.   He was concerned that he might have cancer.  His father is  of kidney cancer 50yo.  Only other family history is grandmother with breast cancer older age.  He did cologuard which was negative.     Evaluation showed hgb 13.5, hematocrit 40.7 with ferritan 340, iron 113, % sat 20 and TIBC 562.   He was negative for hemachromatosis.   He does have bloating and diarrhea.      Review of Systems   Constitutional: Negative.    HENT:  Negative.     Respiratory: Negative.     Cardiovascular: Negative.    Gastrointestinal:  Positive for abdominal distention, abdominal pain and diarrhea.        Bloating   Musculoskeletal: Negative.    Skin: Negative.    Neurological: Negative.    Hematological: Negative.       Objective   BSA: 2.25 meters squared  /87 (BP Location: Left arm)   Pulse 87   Temp 36.8 °C (98.2 °F)   Resp 16   Ht (S) 1.751 m (5' 8.94\")   Wt 104 kg (228 lb 2.8 oz)   SpO2 97%   BMI 33.76 kg/m²      has a past medical history of Cervical root disorders, not elsewhere classified, Hypertension (3 yrs ago), Hyperthyroidism (5 yrs ago), Hypothyroidism (5 yrs ago), Other conditions influencing health status, Other specified health status, and Vitamin D deficiency (3 yrs ago).   has a past surgical history that includes Hernia repair (Bilateral).  Family History   Problem Relation Name Age of Onset    Hypertension Father George malloy     Heart " failure Father George malloy     Kidney cancer Father George malloy     Hypertension Brother           Thomas Malloy  reports that he has never smoked. He has never been exposed to tobacco smoke. He has never used smokeless tobacco.  He  reports that he does not currently use alcohol.  He  reports current drug use. Drug: Marijuana.    Physical Exam  Constitutional:       Appearance: Normal appearance.   Eyes:      Conjunctiva/sclera: Conjunctivae normal.      Pupils: Pupils are equal, round, and reactive to light.   Cardiovascular:      Rate and Rhythm: Normal rate and regular rhythm.      Pulses: Normal pulses.      Heart sounds: Normal heart sounds. No murmur heard.  Pulmonary:      Effort: Pulmonary effort is normal. No respiratory distress.      Breath sounds: Normal breath sounds. No wheezing.   Abdominal:      General: There is no distension.      Palpations: There is no mass.      Tenderness: There is no abdominal tenderness.   Musculoskeletal:         General: Normal range of motion.   Lymphadenopathy:      Cervical: No cervical adenopathy.   Skin:     Coloration: Skin is not jaundiced or pale.      Findings: No bruising or erythema.   Neurological:      General: No focal deficit present.      Motor: No weakness.   Psychiatric:      Comments: When discussing need for colonoscopy he begins to sweat profusely     WBC   Date/Time Value Ref Range Status   04/22/2024 09:35 AM 5.4 4.4 - 11.3 x10*3/uL Final   03/04/2024 02:14 PM 6.5 4.4 - 11.3 x10*3/uL Final   03/18/2021 03:22 PM 5.6 4.4 - 11.3 x10E9/L Final   03/04/2020 04:31 PM 8.9 4.4 - 11.3 x10E9/L Final   10/09/2017 02:54 PM 7.7 4.4 - 11.3 x10E9/L Final     nRBC   Date Value Ref Range Status   03/04/2024 0.0 0.0 - 0.0 /100 WBCs Final   03/18/2021 0.0 0.0 - 0.0 /100 WBC Final   03/04/2020 0.0 0.0 - 0.0 /100 WBC Final   10/09/2017 0.0 0.0 - 0.0 /100 WBC Final     RBC   Date Value Ref Range Status   04/22/2024 4.48 (L) 4.50 - 5.90 x10*6/uL Final    03/04/2024 4.43 (L) 4.50 - 5.90 x10*6/uL Final   03/18/2021 4.25 (L) 4.50 - 5.90 x10E12/L Final   03/04/2020 4.61 4.50 - 5.90 x10E12/L Final   10/09/2017 4.63 4.50 - 5.90 x10E12/L Final     Hemoglobin   Date Value Ref Range Status   04/22/2024 13.5 13.5 - 17.5 g/dL Final   03/04/2024 13.2 (L) 13.5 - 17.5 g/dL Final   03/18/2021 13.1 (L) 13.5 - 17.5 g/dL Final   03/04/2020 14.7 13.5 - 17.5 g/dL Final   10/09/2017 13.7 13.5 - 17.5 g/dL Final     Hematocrit   Date Value Ref Range Status   04/22/2024 40.7 (L) 41.0 - 52.0 % Final   03/04/2024 40.9 (L) 41.0 - 52.0 % Final   03/18/2021 38.4 (L) 41.0 - 52.0 % Final   03/04/2020 43.4 41.0 - 52.0 % Final   10/09/2017 42.0 41.0 - 52.0 % Final     MCV   Date/Time Value Ref Range Status   04/22/2024 09:35 AM 91 80 - 100 fL Final   03/04/2024 02:14 PM 92 80 - 100 fL Final   03/18/2021 03:22 PM 90 80 - 100 fL Final   03/04/2020 04:31 PM 94 80 - 100 fL Final   10/09/2017 02:54 PM 91 80 - 100 fL Final     MCH   Date/Time Value Ref Range Status   04/22/2024 09:35 AM 30.1 26.0 - 34.0 pg Final   03/04/2024 02:14 PM 29.8 26.0 - 34.0 pg Final     MCHC   Date/Time Value Ref Range Status   04/22/2024 09:35 AM 33.2 32.0 - 36.0 g/dL Final   03/04/2024 02:14 PM 32.3 32.0 - 36.0 g/dL Final   03/18/2021 03:22 PM 34.1 32.0 - 36.0 g/dL Final   03/04/2020 04:31 PM 33.9 32.0 - 36.0 g/dL Final   10/09/2017 02:54 PM 32.6 32.0 - 36.0 g/dL Final     RDW   Date/Time Value Ref Range Status   04/22/2024 09:35 AM 12.1 11.5 - 14.5 % Final   03/04/2024 02:14 PM 12.6 11.5 - 14.5 % Final   03/18/2021 03:22 PM 12.1 11.5 - 14.5 % Final   03/04/2020 04:31 PM 12.8 11.5 - 14.5 % Final   10/09/2017 02:54 PM 13.2 11.5 - 14.5 % Final     Platelets   Date/Time Value Ref Range Status   04/22/2024 09:35  150 - 450 x10*3/uL Final   03/04/2024 02:14  150 - 450 x10*3/uL Final   03/18/2021 03:22  150 - 450 x10E9/L Final   03/04/2020 04:31  150 - 450 x10E9/L Final   10/09/2017 02:54  150 - 450  "x10E9/L Final     No results found for: \"MPV\"  Neutrophils %   Date/Time Value Ref Range Status   04/22/2024 09:35 AM 64.8 40.0 - 80.0 % Final   03/04/2020 04:31 PM 65.5 40.0 - 80.0 % Final   10/09/2017 02:54 PM 62.3 40.0 - 80.0 % Final     Immature Granulocytes %, Automated   Date/Time Value Ref Range Status   04/22/2024 09:35 AM 0.2 0.0 - 0.9 % Final     Comment:     Immature Granulocyte Count (IG) includes promyelocytes, myelocytes and metamyelocytes but does not include bands. Percent differential counts (%) should be interpreted in the context of the absolute cell counts (cells/UL).   03/04/2020 04:31 PM 0.2 0.0 - 0.9 % Final     Comment:      Immature Granulocyte Count (IG) includes promyelocytes,    myelocytes and metamyelocytes but does not include bands.   Percent differential counts (%) should be interpreted in the   context of the absolute cell counts (cells/L).     10/09/2017 02:54 PM 0.4 0.0 - 0.9 % Final     Comment:      Percent differential counts (%) should be interpreted in the   context of the absolute cell counts (cells/L).       Lymphocytes %   Date/Time Value Ref Range Status   04/22/2024 09:35 AM 22.0 13.0 - 44.0 % Final   03/04/2020 04:31 PM 22.5 13.0 - 44.0 % Final   10/09/2017 02:54 PM 25.4 13.0 - 44.0 % Final     Monocytes %   Date/Time Value Ref Range Status   04/22/2024 09:35 AM 9.8 2.0 - 10.0 % Final   03/04/2020 04:31 PM 8.3 2.0 - 10.0 % Final   10/09/2017 02:54 PM 8.3 2.0 - 10.0 % Final     Eosinophils %   Date/Time Value Ref Range Status   04/22/2024 09:35 AM 2.8 0.0 - 6.0 % Final   03/04/2020 04:31 PM 2.8 0.0 - 6.0 % Final   10/09/2017 02:54 PM 2.8 0.0 - 6.0 % Final     Basophils %   Date/Time Value Ref Range Status   04/22/2024 09:35 AM 0.4 0.0 - 2.0 % Final   03/04/2020 04:31 PM 0.7 0.0 - 2.0 % Final   10/09/2017 02:54 PM 0.8 0.0 - 2.0 % Final     Neutrophils Absolute   Date/Time Value Ref Range Status   04/22/2024 09:35 AM 3.52 1.20 - 7.70 x10*3/uL Final     Comment:     Percent " differential counts (%) should be interpreted in the context of the absolute cell counts (cells/uL).   03/04/2020 04:31 PM 5.86 1.20 - 7.70 x10E9/L Final   10/09/2017 02:54 PM 4.82 1.20 - 7.70 x10E9/L Final     Immature Granulocytes Absolute, Automated   Date/Time Value Ref Range Status   04/22/2024 09:35 AM 0.01 0.00 - 0.70 x10*3/uL Final     Lymphocytes Absolute   Date/Time Value Ref Range Status   04/22/2024 09:35 AM 1.19 (L) 1.20 - 4.80 x10*3/uL Final   03/04/2020 04:31 PM 2.01 1.20 - 4.80 x10E9/L Final   10/09/2017 02:54 PM 1.96 1.20 - 4.80 x10E9/L Final     Monocytes Absolute   Date/Time Value Ref Range Status   04/22/2024 09:35 AM 0.53 0.10 - 1.00 x10*3/uL Final   03/04/2020 04:31 PM 0.74 0.10 - 1.00 x10E9/L Final   10/09/2017 02:54 PM 0.64 0.10 - 1.00 x10E9/L Final     Eosinophils Absolute   Date/Time Value Ref Range Status   04/22/2024 09:35 AM 0.15 0.00 - 0.70 x10*3/uL Final   03/04/2020 04:31 PM 0.25 0.00 - 0.70 x10E9/L Final   10/09/2017 02:54 PM 0.22 0.00 - 0.70 x10E9/L Final     Basophils Absolute   Date/Time Value Ref Range Status   04/22/2024 09:35 AM 0.02 0.00 - 0.10 x10*3/uL Final   03/04/2020 04:31 PM 0.06 0.00 - 0.10 x10E9/L Final   10/09/2017 02:54 PM 0.06 0.00 - 0.10 x10E9/L Final         Assessment/Plan    Elevated ferritan 306, repeat 340.  He does not have hemachromatosis.  He is likely having inflammatory effect and elevated ferritan is sometimes seen with iron deficiency.  The % saturation is low and TIBC is high indicating his iron levels are actually low and his body is trying to bind as much iron as possible.    B12 low at 326, advised to take liquid B12 1000mcg SL every day will help fatigue and improve energy as well as improve anemia  Anemia likely related to low B12 and iron deficiency. He is recommended to take oral iron supplements   He needs to get colonoscopy.  It is standard of care for patient with iron deficiency and particularly with family member with Lopez related cancer.    Father with kidney cancer 52yo, no other significant family history        Diagnoses and all orders for this visit:  Abdominal bloating  -     Referral to Gastroenterology; Future  Anemia, unspecified type  -     Clinic Appointment Request Follow up  -     CBC and Auto Differential; Future  -     Comprehensive Metabolic Panel; Future  -     Ferritin; Future  -     Iron and TIBC; Future  -     Vitamin B12; Future  -     Clinic Appointment Request; Future  -     Referral to Gastroenterology; Future  Family history of kidney cancer  -     Referral to Gastroenterology; Future           Nahomi Cunningham PA-C

## 2024-05-22 NOTE — PROGRESS NOTES
Gastroenterology Office Visit     History of Present Illness:   Thomas Malloy is a 49 y.o. male who presents to GI clinic for anemia. He was sent by hematology for EMANI work up.     He has been having diarrhea on and off. It is about 50/50. He typically moves his bowel 1-2 times a day. When he has diarrhea it is liquid and when he has formed bowel movements they are of normal caliber and color.     Last colonoscopy: NA  Last endosopy: NA    No history of abdominal surgeries    Review of Systems  Review of Systems   Constitutional:  Positive for unexpected weight change (20lbs in the last 4-6 months). Negative for appetite change, chills and fever.   HENT:  Negative for mouth sores, sore throat and trouble swallowing.    Eyes:  Negative for pain and visual disturbance.   Respiratory:  Negative for cough, shortness of breath and wheezing.    Cardiovascular:  Negative for chest pain.   Genitourinary:  Negative for decreased urine volume, dysuria and hematuria.   Musculoskeletal:  Negative for arthralgias, joint swelling and myalgias.   Skin:  Negative for pallor and rash.   Neurological:  Negative for dizziness, weakness, numbness and headaches.   Psychiatric/Behavioral:  Negative for confusion.        Past Medical History   has a past medical history of Cervical root disorders, not elsewhere classified, Hypertension (3 yrs ago), Hyperthyroidism (5 yrs ago), Hypothyroidism (5 yrs ago), Other conditions influencing health status, Other specified health status, and Vitamin D deficiency (3 yrs ago).     Past Surgical History  Past Surgical History:   Procedure Laterality Date    HERNIA REPAIR Bilateral     inguinal       Social History   reports that he has never smoked. He has never been exposed to tobacco smoke. He has never used smokeless tobacco. He reports current alcohol use of about 3.0 standard drinks of alcohol per week. He reports current drug use. Drug: Marijuana.     Family History  family history includes  Heart failure in his father; Hypertension in his brother and father; Kidney cancer in his father.   No family history of stomach or colon cancer. Possible history of Lopez cancers in the family    Allergies  Allergies   Allergen Reactions    Hydrocodone-Acetaminophen Itching and Rash    Pregabalin Hives     Weight gain    Other reaction(s): Other: See Comments       Medications  Current Outpatient Medications   Medication Instructions    amLODIPine (NORVASC) 5 mg, oral, Daily    atorvastatin (LIPITOR) 40 mg, oral, Nightly    buPROPion XL (Wellbutrin XL) 300 mg 24 hr tablet     cholecalciferol (Vitamin D-3) 25 MCG (1000 UT) capsule 1 capsule, oral, Daily    fenofibrate (TRIGLIDE) 160 mg, oral, Daily    levothyroxine (SYNTHROID, LEVOXYL) 137 mcg, oral, Daily before breakfast    lisinopril 40 mg, oral, Daily    traZODone (DESYREL) 100 mg, 2 times daily    Vitamin D3 25 mcg, oral, Daily        Objective   Visit Vitals  BP (!) 150/105   Pulse 67        Physical Exam  Constitutional:       General: He is not in acute distress.     Appearance: Normal appearance.   HENT:      Mouth/Throat:      Mouth: Mucous membranes are moist.      Pharynx: Oropharynx is clear.   Eyes:      General: No scleral icterus.     Extraocular Movements: Extraocular movements intact.      Conjunctiva/sclera: Conjunctivae normal.      Pupils: Pupils are equal, round, and reactive to light.   Cardiovascular:      Rate and Rhythm: Normal rate and regular rhythm.      Heart sounds: No murmur heard.  Pulmonary:      Effort: Pulmonary effort is normal.      Breath sounds: No wheezing or rhonchi.   Abdominal:      General: Bowel sounds are normal. There is no distension.      Palpations: Abdomen is soft. There is no mass.      Tenderness: There is no abdominal tenderness.      Hernia: No hernia is present.   Musculoskeletal:         General: No swelling or deformity.   Skin:     General: Skin is warm.      Coloration: Skin is not jaundiced.  "  Neurological:      General: No focal deficit present.      Mental Status: He is alert and oriented to person, place, and time.   Psychiatric:         Mood and Affect: Mood normal.         LABS  Pertinent labs were reviewed with the patient  Lab Results   Component Value Date    WBC 5.4 04/22/2024    WBC 6.5 03/04/2024    WBC 5.6 03/18/2021    HGB 13.5 04/22/2024    HGB 13.2 (L) 03/04/2024    HGB 13.1 (L) 03/18/2021    HCT 40.7 (L) 04/22/2024    HCT 40.9 (L) 03/04/2024    HCT 38.4 (L) 03/18/2021     04/22/2024     03/04/2024     03/18/2021      Lab Results   Component Value Date     03/04/2024    K 4.4 03/04/2024     03/04/2024    CO2 32 03/04/2024    BUN 18 03/04/2024    CREATININE 1.19 03/04/2024    GLUCOSE 84 03/04/2024    CALCIUM 9.9 03/04/2024      Lab Results   Component Value Date    ALKPHOS 47 03/18/2021    ALKPHOS 58 03/04/2020    ALKPHOS 37 10/09/2017    BILITOT 0.4 03/18/2021    BILITOT 0.5 03/04/2020    BILITOT 0.3 10/09/2017    BILIDIR 0.1 03/18/2021    PROT 7.8 03/18/2021    PROT 8.1 03/04/2020    PROT 8.2 10/09/2017    ALT 16 03/04/2024    ALT 41 03/18/2021    ALT 84 (H) 03/04/2020    AST 14 03/04/2024    AST 28 03/18/2021    AST 61 (H) 03/04/2020      No results found for: \"INR\"   Lab Results   Component Value Date    IRON 113 04/22/2024    TIBC 562 (H) 04/22/2024      Lab Results   Component Value Date    FERRITIN 340 (H) 04/22/2024      Lab Results   Component Value Date    TSH 0.33 (L) 04/22/2024    FREET4 1.11 04/22/2024        Radiology  Pertinent radiology was reviewed with the patient  NA    Endoscopy  Colonoscopy   NA    EGD   NA    Assessment/Plan   Thomas Malloy is a 49 y.o. male who presents to GI clinic for anemia.    Weight loss  Reported 20lbs weight loss in the last 4-6 months, unintentional     Intermittent diarrhea  Occurs with half of his bowel movements. No night symptoms or hematochezia    EMANI (iron deficiency anemia)  Following with " hematology, concerned ferritin is reactive from an inflammatory process      Thomas was seen today for new patient visit, colon cancer screening and egd.  Diagnoses and all orders for this visit:  Iron deficiency anemia, unspecified iron deficiency anemia type  -     Colonoscopy Diagnostic; Future  -     Esophagogastroduodenoscopy (EGD); Future  Anemia, unspecified type  -     Referral to Gastroenterology  Abdominal bloating  -     Referral to Gastroenterology  Family history of kidney cancer  -     Referral to Gastroenterology  Weight loss  -     Colonoscopy Diagnostic; Future  -     Esophagogastroduodenoscopy (EGD); Future  Diarrhea, unspecified type  -     Colonoscopy Diagnostic; Future       Recommend follow up two weeks after endoscopy   Rhoda Witt PA-C

## 2024-06-04 ENCOUNTER — OFFICE VISIT (OUTPATIENT)
Dept: GASTROENTEROLOGY | Facility: CLINIC | Age: 49
End: 2024-06-04
Payer: COMMERCIAL

## 2024-06-04 ENCOUNTER — TELEPHONE (OUTPATIENT)
Dept: GASTROENTEROLOGY | Facility: CLINIC | Age: 49
End: 2024-06-04

## 2024-06-04 VITALS
DIASTOLIC BLOOD PRESSURE: 105 MMHG | BODY MASS INDEX: 34.51 KG/M2 | WEIGHT: 233 LBS | HEIGHT: 69 IN | SYSTOLIC BLOOD PRESSURE: 150 MMHG | HEART RATE: 67 BPM

## 2024-06-04 DIAGNOSIS — Z80.51 FAMILY HISTORY OF KIDNEY CANCER: ICD-10-CM

## 2024-06-04 DIAGNOSIS — R14.0 ABDOMINAL BLOATING: ICD-10-CM

## 2024-06-04 DIAGNOSIS — D64.9 ANEMIA, UNSPECIFIED TYPE: ICD-10-CM

## 2024-06-04 DIAGNOSIS — R63.4 WEIGHT LOSS: ICD-10-CM

## 2024-06-04 DIAGNOSIS — R19.7 DIARRHEA, UNSPECIFIED TYPE: ICD-10-CM

## 2024-06-04 DIAGNOSIS — D50.9 IRON DEFICIENCY ANEMIA, UNSPECIFIED IRON DEFICIENCY ANEMIA TYPE: Primary | ICD-10-CM

## 2024-06-04 DIAGNOSIS — Z12.11 COLON CANCER SCREENING: Primary | ICD-10-CM

## 2024-06-04 PROCEDURE — 99205 OFFICE O/P NEW HI 60 MIN: CPT | Performed by: PHYSICIAN ASSISTANT

## 2024-06-04 PROCEDURE — 3080F DIAST BP >= 90 MM HG: CPT | Performed by: PHYSICIAN ASSISTANT

## 2024-06-04 PROCEDURE — 1036F TOBACCO NON-USER: CPT | Performed by: PHYSICIAN ASSISTANT

## 2024-06-04 PROCEDURE — 3077F SYST BP >= 140 MM HG: CPT | Performed by: PHYSICIAN ASSISTANT

## 2024-06-04 PROCEDURE — 3008F BODY MASS INDEX DOCD: CPT | Performed by: PHYSICIAN ASSISTANT

## 2024-06-04 RX ORDER — SODIUM, POTASSIUM,MAG SULFATES 17.5-3.13G
SOLUTION, RECONSTITUTED, ORAL ORAL
Qty: 354 ML | Refills: 0 | Status: SHIPPED | OUTPATIENT
Start: 2024-06-04 | End: 2024-06-13 | Stop reason: ALTCHOICE

## 2024-06-04 ASSESSMENT — ENCOUNTER SYMPTOMS
DIZZINESS: 0
TROUBLE SWALLOWING: 0
SHORTNESS OF BREATH: 0
JOINT SWELLING: 0
UNEXPECTED WEIGHT CHANGE: 1
CONFUSION: 0
SORE THROAT: 0
ARTHRALGIAS: 0
MYALGIAS: 0
NUMBNESS: 0
COUGH: 0
APPETITE CHANGE: 0
DYSURIA: 0
WHEEZING: 0
CHILLS: 0
WEAKNESS: 0
HEMATURIA: 0
EYE PAIN: 0
HEADACHES: 0
FEVER: 0

## 2024-06-04 NOTE — PATIENT INSTRUCTIONS
Thank you for seeing us in clinic today!     In summary, please do the following:  We will schedule you for your EGD   and Colonoscopy at  .  You will need a  the day of the exam      We will see you again in 2 weeks after endoscopy    If you have any questions or concerns, please call the office at [phone]

## 2024-06-06 ENCOUNTER — ANESTHESIA EVENT (OUTPATIENT)
Dept: GASTROENTEROLOGY | Facility: EXTERNAL LOCATION | Age: 49
End: 2024-06-06

## 2024-06-13 ENCOUNTER — ANESTHESIA (OUTPATIENT)
Dept: GASTROENTEROLOGY | Facility: EXTERNAL LOCATION | Age: 49
End: 2024-06-13

## 2024-06-13 ENCOUNTER — APPOINTMENT (OUTPATIENT)
Dept: GASTROENTEROLOGY | Facility: EXTERNAL LOCATION | Age: 49
End: 2024-06-13
Payer: COMMERCIAL

## 2024-06-13 VITALS
OXYGEN SATURATION: 97 % | WEIGHT: 233 LBS | BODY MASS INDEX: 34.51 KG/M2 | SYSTOLIC BLOOD PRESSURE: 114 MMHG | HEIGHT: 69 IN | DIASTOLIC BLOOD PRESSURE: 72 MMHG | HEART RATE: 85 BPM | RESPIRATION RATE: 16 BRPM | TEMPERATURE: 96.8 F

## 2024-06-13 DIAGNOSIS — R19.7 DIARRHEA, UNSPECIFIED TYPE: ICD-10-CM

## 2024-06-13 DIAGNOSIS — D50.9 IRON DEFICIENCY ANEMIA, UNSPECIFIED IRON DEFICIENCY ANEMIA TYPE: ICD-10-CM

## 2024-06-13 DIAGNOSIS — R63.4 WEIGHT LOSS: ICD-10-CM

## 2024-06-13 PROBLEM — F12.90 CURRENT CANNABIS VAPING ON SOME DAYS: Status: ACTIVE | Noted: 2024-06-13

## 2024-06-13 PROCEDURE — 88305 TISSUE EXAM BY PATHOLOGIST: CPT | Performed by: PATHOLOGY

## 2024-06-13 PROCEDURE — 45380 COLONOSCOPY AND BIOPSY: CPT | Performed by: INTERNAL MEDICINE

## 2024-06-13 PROCEDURE — 0753T DGTZ GLS MCRSCP SLD LEVEL IV: CPT | Mod: TC,ELYLAB | Performed by: INTERNAL MEDICINE

## 2024-06-13 PROCEDURE — 43239 EGD BIOPSY SINGLE/MULTIPLE: CPT | Performed by: INTERNAL MEDICINE

## 2024-06-13 RX ORDER — SODIUM CHLORIDE 9 MG/ML
20 INJECTION, SOLUTION INTRAVENOUS CONTINUOUS
Status: DISCONTINUED | OUTPATIENT
Start: 2024-06-13 | End: 2024-06-14 | Stop reason: HOSPADM

## 2024-06-13 RX ORDER — PROPOFOL 10 MG/ML
INJECTION, EMULSION INTRAVENOUS AS NEEDED
Status: DISCONTINUED | OUTPATIENT
Start: 2024-06-13 | End: 2024-06-13

## 2024-06-13 RX ORDER — LIDOCAINE HYDROCHLORIDE 20 MG/ML
INJECTION, SOLUTION INFILTRATION; PERINEURAL AS NEEDED
Status: DISCONTINUED | OUTPATIENT
Start: 2024-06-13 | End: 2024-06-13

## 2024-06-13 SDOH — HEALTH STABILITY: MENTAL HEALTH: CURRENT SMOKER: 1

## 2024-06-13 ASSESSMENT — PAIN - FUNCTIONAL ASSESSMENT
PAIN_FUNCTIONAL_ASSESSMENT: 0-10

## 2024-06-13 ASSESSMENT — PAIN SCALES - GENERAL
PAIN_LEVEL: 0
PAINLEVEL_OUTOF10: 0 - NO PAIN

## 2024-06-13 ASSESSMENT — COLUMBIA-SUICIDE SEVERITY RATING SCALE - C-SSRS
2. HAVE YOU ACTUALLY HAD ANY THOUGHTS OF KILLING YOURSELF?: NO
6. HAVE YOU EVER DONE ANYTHING, STARTED TO DO ANYTHING, OR PREPARED TO DO ANYTHING TO END YOUR LIFE?: NO
1. IN THE PAST MONTH, HAVE YOU WISHED YOU WERE DEAD OR WISHED YOU COULD GO TO SLEEP AND NOT WAKE UP?: NO

## 2024-06-13 NOTE — H&P
Procedure H&P    Patient Profile-Procedures  Name Thomas Malloy  Date of Birth 1975  MRN [id number]  Address   [address]   [place] [id number] [address]   [place] [address]    Primary Phone Number [phone]  Secondary Phone Number    Cody Soto    Procedure(s):  Procedures: EGD and Colonoscopy  Primary contact name and number   Extended Emergency Contact Information  Primary Emergency Contact: Franca Malloy  Home Phone: [phone]  Work Phone: [phone]  Relation: Spouse  Secondary Emergency Contact: Franca Malloy  Address: [address] [place] [address]  Home Phone: [phone]  Relation: None    General Health  Weight   Vitals:    06/13/24 1243   Weight: 106 kg (233 lb)     BMI Body mass index is 34.41 kg/m².    Allergies  Allergies   Allergen Reactions    Hydrocodone-Acetaminophen Itching and Rash    Pregabalin Hives     Weight gain    Other reaction(s): Other: See Comments       Past Medical History   Past Medical History:   Diagnosis Date    Anxiety     Cervical root disorders, not elsewhere classified     Cervical syndrome    Depression     Hypertension 3 yrs ago    Hyperthyroidism 5 yrs ago    Hypothyroidism 5 yrs ago    Other conditions influencing health status     Displacement of intervertebral disc without myelopathy    Other specified health status     No pertinent past surgical history    Vitamin D deficiency 3 yrs ago       Provider assessment  Diagnosis: Anemia  Medication Reviewed - yes  Prior to Admission medications    Medication Sig Start Date End Date Taking? Authorizing Provider   amLODIPine (Norvasc) 5 mg tablet Take 1 tablet (5 mg) by mouth once daily. 3/4/24  Yes Cody Hines MD   atorvastatin (Lipitor) 40 mg tablet Take 1 tablet (40 mg) by mouth once daily at bedtime. 3/10/24  Yes Cody Hines MD   buPROPion XL (Wellbutrin XL) 300 mg 24 hr tablet  1/11/24  Yes Historical Provider, MD   cholecalciferol  (Vitamin D-3) 25 MCG (1000 UT) capsule Take 1 capsule (25 mcg) by mouth once daily. 12/14/23  Yes Historical Provider, MD   fenofibrate (Triglide) 160 mg tablet Take 1 tablet (160 mg) by mouth once daily. 3/10/24  Yes Cody Hines MD   levothyroxine (Synthroid, Levoxyl) 137 mcg tablet Take 1 tablet (137 mcg) by mouth once daily in the morning. Take before meals. 3/28/24  Yes Umair Pryor APRN-CNP   lisinopril 40 mg tablet Take 1 tablet (40 mg) by mouth once daily. 3/4/24  Yes Cody Hines MD   traZODone (Desyrel) 100 mg tablet 1 tablet (100 mg) 2 times a day. 1/16/24  Yes Historical Provider, MD   Vitamin D3 25 mcg (1,000 unit) capsule Take 1 capsule (25 mcg) by mouth once daily. 3/22/24  Yes Historical Provider, MD   sodium,potassium,mag sulfates (Suprep) 17.5-3.13-1.6 gram recon soln solution Take one bottle twice as directed by the prep instructions 6/4/24 6/13/24 Yes Ajay Pearl MD       Physical Exam  Vitals:    06/13/24 1243   BP: (!) 140/98   Pulse: 101   Resp: 15   Temp: 36.9 °C (98.4 °F)   SpO2: 98%        General: A&Ox3, NAD.  HEENT: AT/NC.   CV: RRR. No murmur.  Resp: CTA bilaterally. No wheezing, rhonchi or rales.   GI: Soft, NT/ND. BSx4.  Extrem: No edema. Pulses intact.  Neuro: No focal deficits.   Psych: Normal mood and affect.      Procedure Plan - pre-procedural (re)assesment completed by physician:  discharge/transfer patient when discharge criteria met    ASA status 2  Mallampati score 1    Ajay Pearl MD  6/13/2024 1:23 PM

## 2024-06-13 NOTE — ANESTHESIA PREPROCEDURE EVALUATION
Patient: Thomas Malloy    Procedure Information       Date/Time: 06/13/24 1320    Scheduled providers: Ajay Pearl MD; POLLY Rick-CRNA    Procedures:       COLONOSCOPY      EGD    Location: [facility]            Relevant Problems   Cardiac   (+) Hypertension      Neuro   (+) Depression   (+) Peripheral neuropathy      Endocrine   (+) Hypothyroidism following radioiodine therapy   (+) Non morbid obesity due to excess calories      Hematology   (+) Anemia   (+) EMANI (iron deficiency anemia)      Musculoskeletal   (+) Chronic bilateral low back pain with bilateral sciatica   (+) Localized, primary osteoarthritis of hand   (+) Pain syndrome, chronic      Advance Directives and General Issues   (+) Current cannabis vaping on some days       Clinical information reviewed:                   NPO Detail:  No data recorded     Physical Exam    Airway  Mallampati: I  TM distance: >3 FB  Neck ROM: full     Cardiovascular - normal exam     Dental   (+) upper dentures  Comments: Intact; lower dentures out   Pulmonary - normal exam     Abdominal   (+) obese         Anesthesia Plan    History of general anesthesia?: yes  History of complications of general anesthesia?: no    ASA 2     MAC   (Preoxygenated 2L prior to procedure.  Patient positioned self to comfort prior to sedation administered; eyes closed; continuous monitoring)  The patient is a current smoker.  Patient was previously instructed to abstain from smoking on day of procedure.  Patient did not smoke on day of procedure.    intravenous induction   Anesthetic plan and risks discussed with patient.    Plan discussed with CRNA.

## 2024-06-13 NOTE — DISCHARGE INSTRUCTIONS
Patient Instructions Post Procedure      The anesthetics, sedatives or narcotics which were given to you today will be acting in your body for the next 24 hours, so you might feel a little sleepy or groggy.  This feeling should slowly wear off. Carefully read and follow the instructions.     You received sedation today:  - Do not drive or operate any machinery or power tools of any kind.   - No alcoholic beverages today, not even beer or wine.  - Do not make any important decisions or sign any legal documents.  - No over the counter medications that contain alcohol or that may cause drowsiness.    While it is common to experience mild to moderate abdominal distention, gas, or belching after your procedure, if any of these symptoms occur following discharge from the GI Lab or within one week of having your procedure, call the [facility] to be advised whether a visit to your nearest Urgent Care or Emergency Department is indicated.  Take this paper with you if you go.   - If you develop an allergic reaction to the medications that were given during your procedure such as difficulty breathing, rash, hives, severe nausea, vomiting or lightheadedness.  - If you experience chest pain, shortness of breath, severe abdominal pain, fevers and chills.  -If you develop signs and symptoms of bleeding such as blood in your spit, if your stools turn black, tarry, or bloody  - If you have not urinated within 8 hours following your procedure.  - If your IV site becomes painful, red, inflamed, or looks infected.        Your physician recommends the additional following instructions:    -You have a contact number available for emergencies. The signs and symptoms of potential delayed complications were discussed with you. You may return to normal activities tomorrow.  -Resume your previous diet or other if specified.  -Continue your present medications.   -We are waiting for your pathology results, if applicable.  -The  findings and recommendations have been discussed with you and/or family.  - Please see Medication Reconciliation Form for new medication/medications prescribed.     If you experience any problems or have any questions following discharge from the GI Lab, please call: [phone] from 7 am- 4:30 pm.  In the event of an emergency please go to the closest Emergency Department or call Dr. Pearl after hours [phone]

## 2024-06-13 NOTE — ANESTHESIA POSTPROCEDURE EVALUATION
Patient: Thomas Malloy    Procedure Summary       Date: 06/13/24 Room / Location: [facility]    Anesthesia Start: 1322 Anesthesia Stop:     Procedures:       COLONOSCOPY      EGD Diagnosis:       Iron deficiency anemia, unspecified iron deficiency anemia type      Weight loss      Diarrhea, unspecified type    Scheduled Providers: Ajay Pearl MD; RIANNA Rick Responsible Provider: RIANNA Rick    Anesthesia Type: MAC ASA Status: 2            Anesthesia Type: MAC    Vitals Value Taken Time   /116 06/13/24 1347   Temp 36 06/13/24 1347   Pulse 93 06/13/24 1347   Resp 17 06/13/24 1347   SpO2 96 06/13/24 1347       Anesthesia Post Evaluation    Patient location during evaluation: bedside  Patient participation: complete - patient participated  Level of consciousness: awake  Pain score: 0  Pain management: adequate  Airway patency: patent  Cardiovascular status: acceptable  Respiratory status: acceptable and room air  Hydration status: acceptable  Postoperative Nausea and Vomiting: none      No notable events documented.

## 2024-06-24 ENCOUNTER — LAB (OUTPATIENT)
Dept: LAB | Facility: LAB | Age: 49
End: 2024-06-24
Payer: COMMERCIAL

## 2024-06-24 DIAGNOSIS — E78.2 MIXED HYPERLIPIDEMIA: ICD-10-CM

## 2024-06-24 LAB
CHOLEST SERPL-MCNC: 396 MG/DL (ref 0–199)
CHOLESTEROL/HDL RATIO: 7.5
HDLC SERPL-MCNC: 53 MG/DL
LDLC SERPL CALC-MCNC: ABNORMAL MG/DL
NON HDL CHOLESTEROL: 343 MG/DL (ref 0–149)
TRIGL SERPL-MCNC: 630 MG/DL (ref 0–149)
VLDL: ABNORMAL

## 2024-06-24 PROCEDURE — 36415 COLL VENOUS BLD VENIPUNCTURE: CPT

## 2024-06-24 PROCEDURE — 80061 LIPID PANEL: CPT

## 2024-06-27 DIAGNOSIS — E78.2 MIXED HYPERLIPIDEMIA: ICD-10-CM

## 2024-06-27 RX ORDER — ATORVASTATIN CALCIUM 40 MG/1
40 TABLET, FILM COATED ORAL NIGHTLY
Qty: 90 TABLET | Refills: 0 | Status: SHIPPED | OUTPATIENT
Start: 2024-06-27

## 2024-06-27 RX ORDER — GEMFIBROZIL 600 MG/1
600 TABLET, FILM COATED ORAL 2 TIMES DAILY
Qty: 180 TABLET | Refills: 0 | Status: SHIPPED | OUTPATIENT
Start: 2024-06-27

## 2024-07-01 LAB
LABORATORY COMMENT REPORT: NORMAL
PATH REPORT.FINAL DX SPEC: NORMAL
PATH REPORT.GROSS SPEC: NORMAL
PATH REPORT.TOTAL CANCER: NORMAL

## 2024-08-14 ENCOUNTER — OFFICE VISIT (OUTPATIENT)
Dept: HEMATOLOGY/ONCOLOGY | Facility: CLINIC | Age: 49
End: 2024-08-14
Payer: COMMERCIAL

## 2024-08-14 VITALS
SYSTOLIC BLOOD PRESSURE: 141 MMHG | BODY MASS INDEX: 35.23 KG/M2 | DIASTOLIC BLOOD PRESSURE: 93 MMHG | WEIGHT: 238.54 LBS | TEMPERATURE: 96.6 F | OXYGEN SATURATION: 98 % | RESPIRATION RATE: 17 BRPM | HEART RATE: 74 BPM

## 2024-08-14 DIAGNOSIS — D64.9 ANEMIA, UNSPECIFIED TYPE: ICD-10-CM

## 2024-08-14 DIAGNOSIS — D50.8 IRON DEFICIENCY ANEMIA SECONDARY TO INADEQUATE DIETARY IRON INTAKE: Primary | ICD-10-CM

## 2024-08-14 DIAGNOSIS — E89.0 POSTABLATIVE HYPOTHYROIDISM: ICD-10-CM

## 2024-08-14 LAB
ALBUMIN SERPL BCP-MCNC: 5.2 G/DL (ref 3.4–5)
ALP SERPL-CCNC: 38 U/L (ref 33–120)
ALT SERPL W P-5'-P-CCNC: 69 U/L (ref 10–52)
ANION GAP SERPL CALC-SCNC: 14 MMOL/L (ref 10–20)
AST SERPL W P-5'-P-CCNC: 40 U/L (ref 9–39)
BASOPHILS # BLD AUTO: 0.04 X10*3/UL (ref 0–0.1)
BASOPHILS NFR BLD AUTO: 0.8 %
BILIRUB SERPL-MCNC: 0.4 MG/DL (ref 0–1.2)
BUN SERPL-MCNC: 32 MG/DL (ref 6–23)
CALCIUM SERPL-MCNC: 9.6 MG/DL (ref 8.6–10.3)
CHLORIDE SERPL-SCNC: 102 MMOL/L (ref 98–107)
CO2 SERPL-SCNC: 26 MMOL/L (ref 21–32)
CREAT SERPL-MCNC: 1.64 MG/DL (ref 0.5–1.3)
EGFRCR SERPLBLD CKD-EPI 2021: 50 ML/MIN/1.73M*2
EOSINOPHIL # BLD AUTO: 0.23 X10*3/UL (ref 0–0.7)
EOSINOPHIL NFR BLD AUTO: 4.4 %
ERYTHROCYTE [DISTWIDTH] IN BLOOD BY AUTOMATED COUNT: 13 % (ref 11.5–14.5)
FERRITIN SERPL-MCNC: 493 NG/ML (ref 20–300)
GLUCOSE SERPL-MCNC: 110 MG/DL (ref 74–99)
HCT VFR BLD AUTO: 36.3 % (ref 41–52)
HGB BLD-MCNC: 12.1 G/DL (ref 13.5–17.5)
IMM GRANULOCYTES # BLD AUTO: 0.01 X10*3/UL (ref 0–0.7)
IMM GRANULOCYTES NFR BLD AUTO: 0.2 % (ref 0–0.9)
IRON SATN MFR SERPL: 24 % (ref 25–45)
IRON SERPL-MCNC: 122 UG/DL (ref 35–150)
LYMPHOCYTES # BLD AUTO: 1.64 X10*3/UL (ref 1.2–4.8)
LYMPHOCYTES NFR BLD AUTO: 31.6 %
MCH RBC QN AUTO: 30.9 PG (ref 26–34)
MCHC RBC AUTO-ENTMCNC: 33.3 G/DL (ref 32–36)
MCV RBC AUTO: 93 FL (ref 80–100)
MONOCYTES # BLD AUTO: 0.55 X10*3/UL (ref 0.1–1)
MONOCYTES NFR BLD AUTO: 10.6 %
NEUTROPHILS # BLD AUTO: 2.72 X10*3/UL (ref 1.2–7.7)
NEUTROPHILS NFR BLD AUTO: 52.4 %
PLATELET # BLD AUTO: 250 X10*3/UL (ref 150–450)
POTASSIUM SERPL-SCNC: 4 MMOL/L (ref 3.5–5.3)
PROT SERPL-MCNC: 8 G/DL (ref 6.4–8.2)
RBC # BLD AUTO: 3.91 X10*6/UL (ref 4.5–5.9)
SODIUM SERPL-SCNC: 138 MMOL/L (ref 136–145)
TIBC SERPL-MCNC: 515 UG/DL (ref 240–445)
TSH SERPL-ACNC: 3.77 MIU/L (ref 0.44–3.98)
UIBC SERPL-MCNC: 393 UG/DL (ref 110–370)
VIT B12 SERPL-MCNC: >2000 PG/ML (ref 211–911)
WBC # BLD AUTO: 5.2 X10*3/UL (ref 4.4–11.3)

## 2024-08-14 PROCEDURE — 3077F SYST BP >= 140 MM HG: CPT | Performed by: NURSE PRACTITIONER

## 2024-08-14 PROCEDURE — 82728 ASSAY OF FERRITIN: CPT | Performed by: NURSE PRACTITIONER

## 2024-08-14 PROCEDURE — 82607 VITAMIN B-12: CPT | Performed by: NURSE PRACTITIONER

## 2024-08-14 PROCEDURE — 3080F DIAST BP >= 90 MM HG: CPT | Performed by: NURSE PRACTITIONER

## 2024-08-14 PROCEDURE — 99214 OFFICE O/P EST MOD 30 MIN: CPT | Performed by: NURSE PRACTITIONER

## 2024-08-14 PROCEDURE — 80053 COMPREHEN METABOLIC PANEL: CPT | Performed by: NURSE PRACTITIONER

## 2024-08-14 PROCEDURE — 83540 ASSAY OF IRON: CPT | Performed by: NURSE PRACTITIONER

## 2024-08-14 PROCEDURE — 84443 ASSAY THYROID STIM HORMONE: CPT | Performed by: NURSE PRACTITIONER

## 2024-08-14 PROCEDURE — 85025 COMPLETE CBC W/AUTO DIFF WBC: CPT | Performed by: NURSE PRACTITIONER

## 2024-08-14 PROCEDURE — 36415 COLL VENOUS BLD VENIPUNCTURE: CPT | Performed by: NURSE PRACTITIONER

## 2024-08-14 SDOH — ECONOMIC STABILITY: FOOD INSECURITY: WITHIN THE PAST 12 MONTHS, THE FOOD YOU BOUGHT JUST DIDN'T LAST AND YOU DIDN'T HAVE MONEY TO GET MORE.: NEVER TRUE

## 2024-08-14 SDOH — ECONOMIC STABILITY: FOOD INSECURITY: WITHIN THE PAST 12 MONTHS, YOU WORRIED THAT YOUR FOOD WOULD RUN OUT BEFORE YOU GOT MONEY TO BUY MORE.: NEVER TRUE

## 2024-08-14 ASSESSMENT — PATIENT HEALTH QUESTIONNAIRE - PHQ9
2. FEELING DOWN, DEPRESSED OR HOPELESS: NOT AT ALL
1. LITTLE INTEREST OR PLEASURE IN DOING THINGS: NOT AT ALL
SUM OF ALL RESPONSES TO PHQ9 QUESTIONS 1 AND 2: 0

## 2024-08-14 ASSESSMENT — COLUMBIA-SUICIDE SEVERITY RATING SCALE - C-SSRS
6. HAVE YOU EVER DONE ANYTHING, STARTED TO DO ANYTHING, OR PREPARED TO DO ANYTHING TO END YOUR LIFE?: NO
2. HAVE YOU ACTUALLY HAD ANY THOUGHTS OF KILLING YOURSELF?: NO
1. IN THE PAST MONTH, HAVE YOU WISHED YOU WERE DEAD OR WISHED YOU COULD GO TO SLEEP AND NOT WAKE UP?: NO

## 2024-08-14 ASSESSMENT — PAIN SCALES - GENERAL: PAINLEVEL: 0-NO PAIN

## 2024-08-14 NOTE — PROGRESS NOTES
Patient ID: Thomas Baptiste is a 49 y.o. male.  Referring Physician: Nahomi Cunningham PA-C  [address]  [facility] [address]  [place] [address]  Primary Care Provider: Cody Hines MD  Visit Type: Follow Up      Subjective    HPI  50 yo referred for ferritan 306 done 3/4/24.  Other labs include WBC 6.5, hgb 13.2, with BUN/crt 18/1.19, iron 107, % sat 25 and B12 of 326.  He has history of Graves disease and is on appropriate management for that.  HCV negative.   He is currently taking Flintstones MVI with iron every day.  He is also taking B12 liquid.    He is negative for hemachromatosis.   He does not have family history of anyone needing phlebotomies.   He was concerned that he might have cancer.  His father is  of kidney cancer 50yo.  Only other family history is grandmother with breast cancer older age.  He did cologuard which was negative.   Colonoscopy was normal  He states he feels his energy level is better.     Evaluation showed hgb 13.5, hematocrit 40.7 with ferritan 340, iron 113, % sat 20 and TIBC 562.   He was negative for hemachromatosis.   He does have bloating and diarrhea.     Review of Systems - Oncology No complaints    Objective   BSA: 2.29 meters squared  BP (!) 141/93 (BP Location: Left arm, Patient Position: Sitting, BP Cuff Size: Large adult)   Pulse 74   Temp 35.9 °C (96.6 °F) (Temporal)   Resp 17   Wt 108 kg (238 lb 8.6 oz)   SpO2 98%   BMI 35.23 kg/m²      has a past medical history of Anxiety, Cervical root disorders, not elsewhere classified, Depression, Hypertension (3 yrs ago), Hyperthyroidism (5 yrs ago), Hypothyroidism (5 yrs ago), Other conditions influencing health status, Other specified health status, and Vitamin D deficiency (3 yrs ago).   has a past surgical history that includes Hernia repair (Bilateral).  Family History   Problem Relation Name Age of Onset    Hypertension Father George baptiste     Heart failure Father George baptiste     Kidney cancer Father Edward  oliva     Hypertension Brother           Thomas Malloy  reports that he has never smoked. He has never been exposed to tobacco smoke. He has never used smokeless tobacco.  He  reports current alcohol use of about 3.0 standard drinks of alcohol per week.  He  reports current drug use. Drug: Marijuana.    Physical Exam  Normal exam      WBC   Date/Time Value Ref Range Status   08/14/2024 08:23 AM 5.2 4.4 - 11.3 x10*3/uL Final   04/22/2024 09:35 AM 5.4 4.4 - 11.3 x10*3/uL Final   03/04/2024 02:14 PM 6.5 4.4 - 11.3 x10*3/uL Final     nRBC   Date Value Ref Range Status   03/04/2024 0.0 0.0 - 0.0 /100 WBCs Final   03/18/2021 0.0 0.0 - 0.0 /100 WBC Final   03/04/2020 0.0 0.0 - 0.0 /100 WBC Final   10/09/2017 0.0 0.0 - 0.0 /100 WBC Final     RBC   Date Value Ref Range Status   08/14/2024 3.91 (L) 4.50 - 5.90 x10*6/uL Final   04/22/2024 4.48 (L) 4.50 - 5.90 x10*6/uL Final   03/04/2024 4.43 (L) 4.50 - 5.90 x10*6/uL Final     Hemoglobin   Date Value Ref Range Status   08/14/2024 12.1 (L) 13.5 - 17.5 g/dL Final   04/22/2024 13.5 13.5 - 17.5 g/dL Final   03/04/2024 13.2 (L) 13.5 - 17.5 g/dL Final     Hematocrit   Date Value Ref Range Status   08/14/2024 36.3 (L) 41.0 - 52.0 % Final   04/22/2024 40.7 (L) 41.0 - 52.0 % Final   03/04/2024 40.9 (L) 41.0 - 52.0 % Final     MCV   Date/Time Value Ref Range Status   08/14/2024 08:23 AM 93 80 - 100 fL Final   04/22/2024 09:35 AM 91 80 - 100 fL Final   03/04/2024 02:14 PM 92 80 - 100 fL Final     MCH   Date/Time Value Ref Range Status   08/14/2024 08:23 AM 30.9 26.0 - 34.0 pg Final   04/22/2024 09:35 AM 30.1 26.0 - 34.0 pg Final   03/04/2024 02:14 PM 29.8 26.0 - 34.0 pg Final     MCHC   Date/Time Value Ref Range Status   08/14/2024 08:23 AM 33.3 32.0 - 36.0 g/dL Final   04/22/2024 09:35 AM 33.2 32.0 - 36.0 g/dL Final   03/04/2024 02:14 PM 32.3 32.0 - 36.0 g/dL Final     RDW   Date/Time Value Ref Range Status   08/14/2024 08:23 AM 13.0 11.5 - 14.5 % Final   04/22/2024 09:35 AM 12.1  "11.5 - 14.5 % Final   03/04/2024 02:14 PM 12.6 11.5 - 14.5 % Final     Platelets   Date/Time Value Ref Range Status   08/14/2024 08:23  150 - 450 x10*3/uL Final   04/22/2024 09:35  150 - 450 x10*3/uL Final   03/04/2024 02:14  150 - 450 x10*3/uL Final     No results found for: \"MPV\"  Neutrophils %   Date/Time Value Ref Range Status   08/14/2024 08:23 AM 52.4 40.0 - 80.0 % Final   04/22/2024 09:35 AM 64.8 40.0 - 80.0 % Final   03/04/2020 04:31 PM 65.5 40.0 - 80.0 % Final   10/09/2017 02:54 PM 62.3 40.0 - 80.0 % Final     Immature Granulocytes %, Automated   Date/Time Value Ref Range Status   08/14/2024 08:23 AM 0.2 0.0 - 0.9 % Final     Comment:     Immature Granulocyte Count (IG) includes promyelocytes, myelocytes and metamyelocytes but does not include bands. Percent differential counts (%) should be interpreted in the context of the absolute cell counts (cells/UL).   04/22/2024 09:35 AM 0.2 0.0 - 0.9 % Final     Comment:     Immature Granulocyte Count (IG) includes promyelocytes, myelocytes and metamyelocytes but does not include bands. Percent differential counts (%) should be interpreted in the context of the absolute cell counts (cells/UL).   03/04/2020 04:31 PM 0.2 0.0 - 0.9 % Final     Comment:      Immature Granulocyte Count (IG) includes promyelocytes,    myelocytes and metamyelocytes but does not include bands.   Percent differential counts (%) should be interpreted in the   context of the absolute cell counts (cells/L).     10/09/2017 02:54 PM 0.4 0.0 - 0.9 % Final     Comment:      Percent differential counts (%) should be interpreted in the   context of the absolute cell counts (cells/L).       Lymphocytes %   Date/Time Value Ref Range Status   08/14/2024 08:23 AM 31.6 13.0 - 44.0 % Final   04/22/2024 09:35 AM 22.0 13.0 - 44.0 % Final   03/04/2020 04:31 PM 22.5 13.0 - 44.0 % Final   10/09/2017 02:54 PM 25.4 13.0 - 44.0 % Final     Monocytes %   Date/Time Value Ref Range Status "   08/14/2024 08:23 AM 10.6 2.0 - 10.0 % Final   04/22/2024 09:35 AM 9.8 2.0 - 10.0 % Final   03/04/2020 04:31 PM 8.3 2.0 - 10.0 % Final   10/09/2017 02:54 PM 8.3 2.0 - 10.0 % Final     Eosinophils %   Date/Time Value Ref Range Status   08/14/2024 08:23 AM 4.4 0.0 - 6.0 % Final   04/22/2024 09:35 AM 2.8 0.0 - 6.0 % Final   03/04/2020 04:31 PM 2.8 0.0 - 6.0 % Final   10/09/2017 02:54 PM 2.8 0.0 - 6.0 % Final     Basophils %   Date/Time Value Ref Range Status   08/14/2024 08:23 AM 0.8 0.0 - 2.0 % Final   04/22/2024 09:35 AM 0.4 0.0 - 2.0 % Final   03/04/2020 04:31 PM 0.7 0.0 - 2.0 % Final   10/09/2017 02:54 PM 0.8 0.0 - 2.0 % Final     Neutrophils Absolute   Date/Time Value Ref Range Status   08/14/2024 08:23 AM 2.72 1.20 - 7.70 x10*3/uL Final     Comment:     Percent differential counts (%) should be interpreted in the context of the absolute cell counts (cells/uL).   04/22/2024 09:35 AM 3.52 1.20 - 7.70 x10*3/uL Final     Comment:     Percent differential counts (%) should be interpreted in the context of the absolute cell counts (cells/uL).   03/04/2020 04:31 PM 5.86 1.20 - 7.70 x10E9/L Final   10/09/2017 02:54 PM 4.82 1.20 - 7.70 x10E9/L Final     Immature Granulocytes Absolute, Automated   Date/Time Value Ref Range Status   08/14/2024 08:23 AM 0.01 0.00 - 0.70 x10*3/uL Final   04/22/2024 09:35 AM 0.01 0.00 - 0.70 x10*3/uL Final     Lymphocytes Absolute   Date/Time Value Ref Range Status   08/14/2024 08:23 AM 1.64 1.20 - 4.80 x10*3/uL Final   04/22/2024 09:35 AM 1.19 (L) 1.20 - 4.80 x10*3/uL Final   03/04/2020 04:31 PM 2.01 1.20 - 4.80 x10E9/L Final   10/09/2017 02:54 PM 1.96 1.20 - 4.80 x10E9/L Final     Monocytes Absolute   Date/Time Value Ref Range Status   08/14/2024 08:23 AM 0.55 0.10 - 1.00 x10*3/uL Final   04/22/2024 09:35 AM 0.53 0.10 - 1.00 x10*3/uL Final   03/04/2020 04:31 PM 0.74 0.10 - 1.00 x10E9/L Final   10/09/2017 02:54 PM 0.64 0.10 - 1.00 x10E9/L Final     Eosinophils Absolute   Date/Time Value Ref  Range Status   08/14/2024 08:23 AM 0.23 0.00 - 0.70 x10*3/uL Final   04/22/2024 09:35 AM 0.15 0.00 - 0.70 x10*3/uL Final   03/04/2020 04:31 PM 0.25 0.00 - 0.70 x10E9/L Final   10/09/2017 02:54 PM 0.22 0.00 - 0.70 x10E9/L Final     Basophils Absolute   Date/Time Value Ref Range Status   08/14/2024 08:23 AM 0.04 0.00 - 0.10 x10*3/uL Final   04/22/2024 09:35 AM 0.02 0.00 - 0.10 x10*3/uL Final   03/04/2020 04:31 PM 0.06 0.00 - 0.10 x10E9/L Final   10/09/2017 02:54 PM 0.06 0.00 - 0.10 x10E9/L Final           Assessment/Plan    Elevated ferritan 306, repeat 340.  He does not have hemachromatosis.  He is likely having inflammatory effect and elevated ferritan is sometimes seen with iron deficiency.  The % saturation is low and TIBC is high indicating his iron levels are actually low and his body is trying to bind as much iron as possible.  Labs today are pending, he is taking Flintstones oral iron  B12 low at 326, is taking liquid B12 1000mcg SL every day will help fatigue and improve energy as well as improve anemia  Anemia likely related to low B12 and iron deficiency. Waiting for labs  He had normal colonoscopy.  It is standard of care for patient with iron deficiency and particularly with family member with Lopez related cancer.   Father with kidney cancer 52yo, no other significant family history        Diagnoses and all orders for this visit:  Iron deficiency anemia secondary to inadequate dietary iron intake  -     CBC and Auto Differential; Future  -     Ferritin; Future  -     Iron and TIBC; Future  -     Vitamin B12; Future  -     CBC and Auto Differential; Future  -     Comprehensive Metabolic Panel; Future  -     Ferritin; Future  -     Iron and TIBC; Future  -     Clinic Appointment Request Follow up; Future  Anemia, unspecified type  -     Clinic Appointment Request  -     Comprehensive Metabolic Panel  -     Vitamin B12; Future  -     Clinic Appointment Request Follow up; Future  Postablative  hypothyroidism  -     TSH with reflex to Free T4 if abnormal           Nahomi Cunningham PA-C                          allopurinol 300 mg oral tablet: 1 tab(s) orally once a day  ascorbic acid 500 mg oral tablet: 1 tab(s) orally once a day  aspirin 81 mg oral tablet:   cholecalciferol oral tablet: 2000 unit(s) orally once a day  CoQ10 300 mg oral capsule: 1 cap(s) orally 2 times a day  ezetimibe 10 mg oral tablet: 1 tab(s) orally once a day  pravastatin 40 mg oral tablet: 1 tab(s) orally once a day, pm  Senna 8.6 mg oral tablet: 1 tab(s) orally once a day (at bedtime)  tadalafil 5 mg oral tablet: 1 tab(s) orally once a day (at bedtime)

## 2024-08-22 ENCOUNTER — DOCUMENTATION (OUTPATIENT)
Dept: HEMATOLOGY/ONCOLOGY | Facility: CLINIC | Age: 49
End: 2024-08-22
Payer: COMMERCIAL

## 2024-08-22 NOTE — PROGRESS NOTES
LM labs look good  Iron is perfect, continue the [facility] Chewable MVI with iron  B12 is high, decreased to every other day or three times per week.    ART RamosC

## 2024-08-23 ENCOUNTER — APPOINTMENT (OUTPATIENT)
Dept: ENDOCRINOLOGY | Facility: CLINIC | Age: 49
End: 2024-08-23
Payer: COMMERCIAL

## 2024-09-17 DIAGNOSIS — I10 PRIMARY HYPERTENSION: ICD-10-CM

## 2024-09-17 RX ORDER — AMLODIPINE BESYLATE 5 MG/1
5 TABLET ORAL DAILY
Qty: 14 TABLET | Refills: 0 | Status: SHIPPED | OUTPATIENT
Start: 2024-09-17

## 2024-09-17 RX ORDER — LISINOPRIL 40 MG/1
40 TABLET ORAL DAILY
Qty: 14 TABLET | Refills: 0 | Status: SHIPPED | OUTPATIENT
Start: 2024-09-17

## 2024-09-25 ENCOUNTER — APPOINTMENT (OUTPATIENT)
Dept: PRIMARY CARE | Facility: CLINIC | Age: 49
End: 2024-09-25
Payer: COMMERCIAL

## 2024-10-04 ENCOUNTER — APPOINTMENT (OUTPATIENT)
Dept: PRIMARY CARE | Facility: CLINIC | Age: 49
End: 2024-10-04
Payer: COMMERCIAL

## 2024-10-04 VITALS
HEART RATE: 74 BPM | WEIGHT: 236 LBS | DIASTOLIC BLOOD PRESSURE: 80 MMHG | SYSTOLIC BLOOD PRESSURE: 122 MMHG | RESPIRATION RATE: 16 BRPM | OXYGEN SATURATION: 98 % | BODY MASS INDEX: 34.96 KG/M2 | HEIGHT: 69 IN

## 2024-10-04 DIAGNOSIS — G89.29 CHRONIC RIGHT SHOULDER PAIN: ICD-10-CM

## 2024-10-04 DIAGNOSIS — M25.511 CHRONIC RIGHT SHOULDER PAIN: ICD-10-CM

## 2024-10-04 DIAGNOSIS — E78.2 MIXED HYPERLIPIDEMIA: ICD-10-CM

## 2024-10-04 DIAGNOSIS — I10 PRIMARY HYPERTENSION: Primary | ICD-10-CM

## 2024-10-04 PROCEDURE — 3079F DIAST BP 80-89 MM HG: CPT | Performed by: FAMILY MEDICINE

## 2024-10-04 PROCEDURE — 3008F BODY MASS INDEX DOCD: CPT | Performed by: FAMILY MEDICINE

## 2024-10-04 PROCEDURE — 99214 OFFICE O/P EST MOD 30 MIN: CPT | Performed by: FAMILY MEDICINE

## 2024-10-04 PROCEDURE — 3074F SYST BP LT 130 MM HG: CPT | Performed by: FAMILY MEDICINE

## 2024-10-04 PROCEDURE — 1036F TOBACCO NON-USER: CPT | Performed by: FAMILY MEDICINE

## 2024-10-04 RX ORDER — LISINOPRIL 40 MG/1
40 TABLET ORAL DAILY
Qty: 90 TABLET | Refills: 1 | Status: SHIPPED | OUTPATIENT
Start: 2024-10-04

## 2024-10-04 RX ORDER — AMLODIPINE BESYLATE 5 MG/1
5 TABLET ORAL DAILY
Qty: 90 TABLET | Refills: 1 | Status: SHIPPED | OUTPATIENT
Start: 2024-10-04

## 2024-10-04 NOTE — PATIENT INSTRUCTIONS
Fasting labs.  Refilled meds (except cholesterol meds).  Will refill cholesterol meds after reviewing lab results.    Referred to ortho as requested.    F/U 6 months: Annual wellness visit.    Lab services: [address]  Hours: M-F 6:30a-6p, Sat 8a-12p  Phone: [phone], Option 1

## 2024-10-04 NOTE — PROGRESS NOTES
"Subjective   Patient ID: Thomas Malloy is a 49 y.o. male who presents for Med Refill.    HPI   Has HLD, HTN.  Taking med(s) as directed.  Requests refills.    Right shoulder pain x 5 yrs.  Pain is sharp.  Intermittent (daily).  Worse w/nothing.  Improved w/nothing.  Max 8/10.  Ave 4-5/10.  Had injection 5 yrs ago.  Wants ortho referral.    Review of Systems  No other complaints.     Objective   /80   Pulse 74   Resp 16   Ht 1.753 m (5' 9\")   Wt 107 kg (236 lb)   SpO2 98%   BMI 34.85 kg/m²     Physical Exam  Constitutional:       General: He is not in acute distress.     Appearance: He is obese.   Cardiovascular:      Rate and Rhythm: Normal rate and regular rhythm.      Heart sounds: Normal heart sounds. No murmur heard.     No friction rub. No gallop.   Pulmonary:      Effort: Pulmonary effort is normal.      Breath sounds: Normal breath sounds. No wheezing, rhonchi or rales.   Musculoskeletal:      Comments: Right shoulder: Nontender, FROM, Negative impingement sign, Negative drop test, Negative empty can   Neurological:      Mental Status: He is oriented to person, place, and time.   Psychiatric:         Mood and Affect: Mood normal.         Behavior: Behavior normal.     Assessment/Plan   Diagnoses and all orders for this visit:  Primary hypertension  -     Basic Metabolic Panel; Future  -     amLODIPine (Norvasc) 5 mg tablet; Take 1 tablet (5 mg) by mouth once daily.  -     lisinopril 40 mg tablet; Take 1 tablet (40 mg) by mouth once daily.  Mixed hyperlipidemia  -     Lipid Panel; Future  Chronic right shoulder pain  -     Referral to Orthopaedic Surgery; Future    Fasting labs.  Refilled meds (except cholesterol meds).  Will refill cholesterol meds after reviewing lab results.    Referred to ortho as requested.    F/U 6 months: Annual wellness visit.  "

## 2024-10-15 ENCOUNTER — TELEPHONE (OUTPATIENT)
Dept: ENDOCRINOLOGY | Facility: CLINIC | Age: 49
End: 2024-10-15
Payer: COMMERCIAL

## 2024-10-21 ENCOUNTER — LAB (OUTPATIENT)
Dept: LAB | Facility: LAB | Age: 49
End: 2024-10-21
Payer: COMMERCIAL

## 2024-10-21 DIAGNOSIS — E78.2 MIXED HYPERLIPIDEMIA: ICD-10-CM

## 2024-10-21 DIAGNOSIS — I10 PRIMARY HYPERTENSION: ICD-10-CM

## 2024-10-21 LAB
ANION GAP SERPL CALC-SCNC: 17 MMOL/L (ref 10–20)
BUN SERPL-MCNC: 26 MG/DL (ref 6–23)
CALCIUM SERPL-MCNC: 10.7 MG/DL (ref 8.6–10.6)
CHLORIDE SERPL-SCNC: 100 MMOL/L (ref 98–107)
CHOLEST SERPL-MCNC: 231 MG/DL (ref 0–199)
CHOLESTEROL/HDL RATIO: 3
CO2 SERPL-SCNC: 27 MMOL/L (ref 21–32)
CREAT SERPL-MCNC: 1.38 MG/DL (ref 0.5–1.3)
EGFRCR SERPLBLD CKD-EPI 2021: 62 ML/MIN/1.73M*2
GLUCOSE SERPL-MCNC: 87 MG/DL (ref 74–99)
HDLC SERPL-MCNC: 76.2 MG/DL
LDLC SERPL CALC-MCNC: 129 MG/DL
NON HDL CHOLESTEROL: 155 MG/DL (ref 0–149)
POTASSIUM SERPL-SCNC: 4.2 MMOL/L (ref 3.5–5.3)
SODIUM SERPL-SCNC: 140 MMOL/L (ref 136–145)
TRIGL SERPL-MCNC: 127 MG/DL (ref 0–149)
VLDL: 25 MG/DL (ref 0–40)

## 2024-10-21 PROCEDURE — 80048 BASIC METABOLIC PNL TOTAL CA: CPT

## 2024-10-21 PROCEDURE — 80061 LIPID PANEL: CPT

## 2024-10-21 PROCEDURE — 36415 COLL VENOUS BLD VENIPUNCTURE: CPT

## 2024-10-22 DIAGNOSIS — E83.52 SERUM CALCIUM ELEVATED: Primary | ICD-10-CM

## 2024-10-22 DIAGNOSIS — E78.2 MIXED HYPERLIPIDEMIA: ICD-10-CM

## 2024-10-22 RX ORDER — GEMFIBROZIL 600 MG/1
600 TABLET, FILM COATED ORAL 2 TIMES DAILY
Qty: 180 TABLET | Refills: 1 | Status: SHIPPED | OUTPATIENT
Start: 2024-10-22

## 2024-10-22 RX ORDER — ATORVASTATIN CALCIUM 80 MG/1
80 TABLET, FILM COATED ORAL NIGHTLY
Qty: 90 TABLET | Refills: 0 | Status: SHIPPED | OUTPATIENT
Start: 2024-10-22

## 2024-10-22 RX ORDER — GEMFIBROZIL 600 MG/1
600 TABLET, FILM COATED ORAL 2 TIMES DAILY
Qty: 180 TABLET | Refills: 0 | Status: SHIPPED | OUTPATIENT
Start: 2024-10-22 | End: 2024-10-22 | Stop reason: SDUPTHER

## 2024-11-07 ENCOUNTER — TELEPHONE (OUTPATIENT)
Dept: ENDOCRINOLOGY | Facility: CLINIC | Age: 49
End: 2024-11-07
Payer: COMMERCIAL

## 2024-11-07 NOTE — TELEPHONE ENCOUNTER
Thomas Malloy   1975   [id number]   [phone]       Patient called to schedule appt with MD due to CNP has left the practice. Appt scheduled for 3/27/25 per last office note.

## 2025-01-02 ENCOUNTER — HOSPITAL ENCOUNTER (OUTPATIENT)
Dept: RADIOLOGY | Facility: CLINIC | Age: 50
Discharge: HOME | End: 2025-01-02
Payer: COMMERCIAL

## 2025-01-02 DIAGNOSIS — E78.2 MIXED HYPERLIPIDEMIA: ICD-10-CM

## 2025-01-02 PROCEDURE — 75571 CT HRT W/O DYE W/CA TEST: CPT

## 2025-01-13 DIAGNOSIS — E78.2 MIXED HYPERLIPIDEMIA: ICD-10-CM

## 2025-02-06 ENCOUNTER — LAB (OUTPATIENT)
Dept: LAB | Facility: HOSPITAL | Age: 50
End: 2025-02-06
Payer: COMMERCIAL

## 2025-02-06 DIAGNOSIS — D50.8 IRON DEFICIENCY ANEMIA SECONDARY TO INADEQUATE DIETARY IRON INTAKE: ICD-10-CM

## 2025-02-06 DIAGNOSIS — D64.9 ANEMIA, UNSPECIFIED TYPE: ICD-10-CM

## 2025-02-06 LAB
BASOPHILS # BLD AUTO: 0.05 X10*3/UL (ref 0–0.1)
BASOPHILS NFR BLD AUTO: 0.9 %
EOSINOPHIL # BLD AUTO: 0.2 X10*3/UL (ref 0–0.7)
EOSINOPHIL NFR BLD AUTO: 3.7 %
ERYTHROCYTE [DISTWIDTH] IN BLOOD BY AUTOMATED COUNT: 12.6 % (ref 11.5–14.5)
FERRITIN SERPL-MCNC: 394 NG/ML (ref 20–300)
HCT VFR BLD AUTO: 36.3 % (ref 41–52)
HGB BLD-MCNC: 12 G/DL (ref 13.5–17.5)
IMM GRANULOCYTES # BLD AUTO: 0.01 X10*3/UL (ref 0–0.7)
IMM GRANULOCYTES NFR BLD AUTO: 0.2 % (ref 0–0.9)
IRON SATN MFR SERPL: 16 % (ref 25–45)
IRON SERPL-MCNC: 80 UG/DL (ref 35–150)
LYMPHOCYTES # BLD AUTO: 1.64 X10*3/UL (ref 1.2–4.8)
LYMPHOCYTES NFR BLD AUTO: 30.5 %
MCH RBC QN AUTO: 30.8 PG (ref 26–34)
MCHC RBC AUTO-ENTMCNC: 33.1 G/DL (ref 32–36)
MCV RBC AUTO: 93 FL (ref 80–100)
MONOCYTES # BLD AUTO: 0.5 X10*3/UL (ref 0.1–1)
MONOCYTES NFR BLD AUTO: 9.3 %
NEUTROPHILS # BLD AUTO: 2.97 X10*3/UL (ref 1.2–7.7)
NEUTROPHILS NFR BLD AUTO: 55.4 %
NRBC BLD-RTO: 0 /100 WBCS (ref 0–0)
PLATELET # BLD AUTO: 316 X10*3/UL (ref 150–450)
RBC # BLD AUTO: 3.89 X10*6/UL (ref 4.5–5.9)
TIBC SERPL-MCNC: 501 UG/DL (ref 240–445)
UIBC SERPL-MCNC: 421 UG/DL (ref 110–370)
VIT B12 SERPL-MCNC: 1255 PG/ML (ref 211–911)
WBC # BLD AUTO: 5.4 X10*3/UL (ref 4.4–11.3)

## 2025-02-06 PROCEDURE — 85025 COMPLETE CBC W/AUTO DIFF WBC: CPT

## 2025-02-06 PROCEDURE — 83550 IRON BINDING TEST: CPT

## 2025-02-06 PROCEDURE — 82728 ASSAY OF FERRITIN: CPT

## 2025-02-06 PROCEDURE — 83540 ASSAY OF IRON: CPT

## 2025-02-06 PROCEDURE — 82607 VITAMIN B-12: CPT

## 2025-02-07 LAB
ANION GAP SERPL CALCULATED.4IONS-SCNC: 12 MMOL/L (CALC) (ref 7–17)
BUN SERPL-MCNC: 22 MG/DL (ref 7–25)
BUN/CREAT SERPL: 16 (CALC) (ref 6–22)
CA-I SERPL-MCNC: 5.3 MG/DL (ref 4.7–5.5)
CALCIUM SERPL-MCNC: 10.2 MG/DL (ref 8.6–10.3)
CHLORIDE SERPL-SCNC: 104 MMOL/L (ref 98–110)
CHOLEST SERPL-MCNC: 177 MG/DL
CHOLEST/HDLC SERPL: 2.4 (CALC)
CO2 SERPL-SCNC: 26 MMOL/L (ref 20–32)
CREAT SERPL-MCNC: 1.35 MG/DL (ref 0.7–1.3)
EGFRCR SERPLBLD CKD-EPI 2021: 64 ML/MIN/1.73M2
GLUCOSE SERPL-MCNC: 102 MG/DL (ref 65–99)
HDLC SERPL-MCNC: 75 MG/DL
LDLC SERPL CALC-MCNC: 85 MG/DL (CALC)
NONHDLC SERPL-MCNC: 102 MG/DL (CALC)
POTASSIUM SERPL-SCNC: 4.9 MMOL/L (ref 3.5–5.3)
PTH-INTACT SERPL-MCNC: 48 PG/ML (ref 16–77)
SODIUM SERPL-SCNC: 142 MMOL/L (ref 135–146)
TRIGL SERPL-MCNC: 82 MG/DL

## 2025-02-09 DIAGNOSIS — E78.2 MIXED HYPERLIPIDEMIA: Primary | ICD-10-CM

## 2025-02-09 DIAGNOSIS — R73.09 ELEVATED GLUCOSE: ICD-10-CM

## 2025-02-09 RX ORDER — ATORVASTATIN CALCIUM 80 MG/1
80 TABLET, FILM COATED ORAL NIGHTLY
Qty: 90 TABLET | Refills: 0 | Status: SHIPPED | OUTPATIENT
Start: 2025-02-09

## 2025-02-10 ENCOUNTER — APPOINTMENT (OUTPATIENT)
Facility: CLINIC | Age: 50
End: 2025-02-10
Payer: COMMERCIAL

## 2025-02-12 ENCOUNTER — OFFICE VISIT (OUTPATIENT)
Dept: HEMATOLOGY/ONCOLOGY | Facility: CLINIC | Age: 50
End: 2025-02-12
Payer: COMMERCIAL

## 2025-02-12 VITALS
DIASTOLIC BLOOD PRESSURE: 95 MMHG | BODY MASS INDEX: 35.93 KG/M2 | TEMPERATURE: 96.8 F | WEIGHT: 243.28 LBS | RESPIRATION RATE: 17 BRPM | SYSTOLIC BLOOD PRESSURE: 151 MMHG | OXYGEN SATURATION: 100 % | HEART RATE: 92 BPM

## 2025-02-12 DIAGNOSIS — D53.8 OTHER SPECIFIED NUTRITIONAL ANEMIAS: Primary | ICD-10-CM

## 2025-02-12 DIAGNOSIS — D50.8 IRON DEFICIENCY ANEMIA SECONDARY TO INADEQUATE DIETARY IRON INTAKE: ICD-10-CM

## 2025-02-12 DIAGNOSIS — D64.9 ANEMIA, UNSPECIFIED TYPE: ICD-10-CM

## 2025-02-12 LAB
EBV EA IGG SER QL: NEGATIVE
EBV NA AB SER QL: POSITIVE
EBV VCA IGG SER IA-ACNC: POSITIVE
EBV VCA IGM SER IA-ACNC: ABNORMAL

## 2025-02-12 PROCEDURE — 99214 OFFICE O/P EST MOD 30 MIN: CPT | Performed by: NURSE PRACTITIONER

## 2025-02-12 PROCEDURE — 3080F DIAST BP >= 90 MM HG: CPT | Performed by: NURSE PRACTITIONER

## 2025-02-12 PROCEDURE — 3077F SYST BP >= 140 MM HG: CPT | Performed by: NURSE PRACTITIONER

## 2025-02-12 ASSESSMENT — COLUMBIA-SUICIDE SEVERITY RATING SCALE - C-SSRS
1. IN THE PAST MONTH, HAVE YOU WISHED YOU WERE DEAD OR WISHED YOU COULD GO TO SLEEP AND NOT WAKE UP?: NO
6. HAVE YOU EVER DONE ANYTHING, STARTED TO DO ANYTHING, OR PREPARED TO DO ANYTHING TO END YOUR LIFE?: NO
2. HAVE YOU ACTUALLY HAD ANY THOUGHTS OF KILLING YOURSELF?: NO

## 2025-02-12 ASSESSMENT — PAIN SCALES - GENERAL: PAINLEVEL_OUTOF10: 0-NO PAIN

## 2025-02-12 NOTE — PROGRESS NOTES
Patient ID: Thomas Baptiste is a 50 y.o. male.  Referring Physician: Nahomi Cunningham PA-C  [address]  [facility] [[email or web address]] [address]  [place] [address]  Primary Care Provider: Cody Hines MD  Visit Type: Follow Up      Subjective    HPI  48 yo referred for ferritan 306 done 3/4/24.  Other labs include WBC 6.5, hgb 13.2, with BUN/crt 18/1.19, iron 107, % sat 25 and B12 of 326.  He has history of Graves disease and is on appropriate management for that.  HCV negative.   He is currently taking Flintstones MVI with iron every day.  He is also taking B12 liquid.    He is negative for hemachromatosis.   He does not have family history of anyone needing phlebotomies.   He was concerned that he might have cancer.  His father is  of kidney cancer 52yo.  Only other family history is grandmother with breast cancer older age.  He did cologuard which was negative.   Colonoscopy was normal  He states he feels his energy level is better.       Review of Systems - Oncology Asymptomatic    Objective   BSA: 2.31 meters squared  BP (!) 151/95 (BP Location: Left arm, Patient Position: Sitting, BP Cuff Size: Large adult)   Pulse 92   Temp 36 °C (96.8 °F) (Temporal)   Resp 17   Wt 110 kg (243 lb 4.4 oz)   SpO2 100%   BMI 35.93 kg/m²      has a past medical history of Anxiety, Cervical root disorders, not elsewhere classified, Depression, Hypertension (3 yrs ago), Hyperthyroidism (5 yrs ago), Hypothyroidism (5 yrs ago), Other conditions influencing health status, Other specified health status, and Vitamin D deficiency (3 yrs ago).   has a past surgical history that includes Hernia repair (Bilateral).  Family History   Problem Relation Name Age of Onset    Hypertension Father George baptiste     Heart failure Father George baptiste     Kidney cancer Father George baptiste     Hypertension Brother           Thomas Baptiste  reports that he has never smoked. He has never been exposed to tobacco smoke. He has never used smokeless  tobacco.  He  reports current alcohol use of about 3.0 standard drinks of alcohol per week.  He  reports current drug use. Drug: Marijuana.    Physical Exam  Constitutional:       Appearance: Normal appearance.   Eyes:      Conjunctiva/sclera: Conjunctivae normal.      Pupils: Pupils are equal, round, and reactive to light.   Cardiovascular:      Rate and Rhythm: Normal rate and regular rhythm.      Pulses: Normal pulses.      Heart sounds: Normal heart sounds. No murmur heard.  Pulmonary:      Effort: Pulmonary effort is normal. No respiratory distress.      Breath sounds: Normal breath sounds. No stridor. No rales.   Abdominal:      General: There is no distension.      Palpations: Abdomen is soft.      Tenderness: There is no abdominal tenderness. There is no rebound.   Musculoskeletal:         General: No swelling. Normal range of motion.   Lymphadenopathy:      Cervical: No cervical adenopathy.   Skin:     Coloration: Skin is not jaundiced or pale.      Findings: No bruising.   Neurological:      Motor: No weakness.     WBC   Date/Time Value Ref Range Status   02/06/2025 09:47 AM 5.4 4.4 - 11.3 x10*3/uL Final   08/14/2024 08:23 AM 5.2 4.4 - 11.3 x10*3/uL Final   04/22/2024 09:35 AM 5.4 4.4 - 11.3 x10*3/uL Final     nRBC   Date Value Ref Range Status   02/06/2025 0.0 0.0 - 0.0 /100 WBCs Final   03/04/2024 0.0 0.0 - 0.0 /100 WBCs Final   03/18/2021 0.0 0.0 - 0.0 /100 WBC Final   03/04/2020 0.0 0.0 - 0.0 /100 WBC Final   10/09/2017 0.0 0.0 - 0.0 /100 WBC Final     RBC   Date Value Ref Range Status   02/06/2025 3.89 (L) 4.50 - 5.90 x10*6/uL Final   08/14/2024 3.91 (L) 4.50 - 5.90 x10*6/uL Final   04/22/2024 4.48 (L) 4.50 - 5.90 x10*6/uL Final     Hemoglobin   Date Value Ref Range Status   02/06/2025 12.0 (L) 13.5 - 17.5 g/dL Final   08/14/2024 12.1 (L) 13.5 - 17.5 g/dL Final   04/22/2024 13.5 13.5 - 17.5 g/dL Final     Hematocrit   Date Value Ref Range Status   02/06/2025 36.3 (L) 41.0 - 52.0 % Final   08/14/2024  "36.3 (L) 41.0 - 52.0 % Final   04/22/2024 40.7 (L) 41.0 - 52.0 % Final     MCV   Date/Time Value Ref Range Status   02/06/2025 09:47 AM 93 80 - 100 fL Final   08/14/2024 08:23 AM 93 80 - 100 fL Final   04/22/2024 09:35 AM 91 80 - 100 fL Final     MCH   Date/Time Value Ref Range Status   02/06/2025 09:47 AM 30.8 26.0 - 34.0 pg Final   08/14/2024 08:23 AM 30.9 26.0 - 34.0 pg Final   04/22/2024 09:35 AM 30.1 26.0 - 34.0 pg Final     MCHC   Date/Time Value Ref Range Status   02/06/2025 09:47 AM 33.1 32.0 - 36.0 g/dL Final   08/14/2024 08:23 AM 33.3 32.0 - 36.0 g/dL Final   04/22/2024 09:35 AM 33.2 32.0 - 36.0 g/dL Final     RDW   Date/Time Value Ref Range Status   02/06/2025 09:47 AM 12.6 11.5 - 14.5 % Final   08/14/2024 08:23 AM 13.0 11.5 - 14.5 % Final   04/22/2024 09:35 AM 12.1 11.5 - 14.5 % Final     Platelets   Date/Time Value Ref Range Status   02/06/2025 09:47  150 - 450 x10*3/uL Final   08/14/2024 08:23  150 - 450 x10*3/uL Final   04/22/2024 09:35  150 - 450 x10*3/uL Final     No results found for: \"MPV\"  Neutrophils %   Date/Time Value Ref Range Status   02/06/2025 09:47 AM 55.4 40.0 - 80.0 % Final   08/14/2024 08:23 AM 52.4 40.0 - 80.0 % Final   04/22/2024 09:35 AM 64.8 40.0 - 80.0 % Final     Immature Granulocytes %, Automated   Date/Time Value Ref Range Status   02/06/2025 09:47 AM 0.2 0.0 - 0.9 % Final     Comment:     Immature Granulocyte Count (IG) includes promyelocytes, myelocytes and metamyelocytes but does not include bands. Percent differential counts (%) should be interpreted in the context of the absolute cell counts (cells/UL).   08/14/2024 08:23 AM 0.2 0.0 - 0.9 % Final     Comment:     Immature Granulocyte Count (IG) includes promyelocytes, myelocytes and metamyelocytes but does not include bands. Percent differential counts (%) should be interpreted in the context of the absolute cell counts (cells/UL).   04/22/2024 09:35 AM 0.2 0.0 - 0.9 % Final     Comment:     Immature " Granulocyte Count (IG) includes promyelocytes, myelocytes and metamyelocytes but does not include bands. Percent differential counts (%) should be interpreted in the context of the absolute cell counts (cells/UL).     Lymphocytes %   Date/Time Value Ref Range Status   02/06/2025 09:47 AM 30.5 13.0 - 44.0 % Final   08/14/2024 08:23 AM 31.6 13.0 - 44.0 % Final   04/22/2024 09:35 AM 22.0 13.0 - 44.0 % Final     Monocytes %   Date/Time Value Ref Range Status   02/06/2025 09:47 AM 9.3 2.0 - 10.0 % Final   08/14/2024 08:23 AM 10.6 2.0 - 10.0 % Final   04/22/2024 09:35 AM 9.8 2.0 - 10.0 % Final     Eosinophils %   Date/Time Value Ref Range Status   02/06/2025 09:47 AM 3.7 0.0 - 6.0 % Final   08/14/2024 08:23 AM 4.4 0.0 - 6.0 % Final   04/22/2024 09:35 AM 2.8 0.0 - 6.0 % Final     Basophils %   Date/Time Value Ref Range Status   02/06/2025 09:47 AM 0.9 0.0 - 2.0 % Final   08/14/2024 08:23 AM 0.8 0.0 - 2.0 % Final   04/22/2024 09:35 AM 0.4 0.0 - 2.0 % Final     Neutrophils Absolute   Date/Time Value Ref Range Status   02/06/2025 09:47 AM 2.97 1.20 - 7.70 x10*3/uL Final     Comment:     Percent differential counts (%) should be interpreted in the context of the absolute cell counts (cells/uL).   08/14/2024 08:23 AM 2.72 1.20 - 7.70 x10*3/uL Final     Comment:     Percent differential counts (%) should be interpreted in the context of the absolute cell counts (cells/uL).   04/22/2024 09:35 AM 3.52 1.20 - 7.70 x10*3/uL Final     Comment:     Percent differential counts (%) should be interpreted in the context of the absolute cell counts (cells/uL).     Immature Granulocytes Absolute, Automated   Date/Time Value Ref Range Status   02/06/2025 09:47 AM 0.01 0.00 - 0.70 x10*3/uL Final   08/14/2024 08:23 AM 0.01 0.00 - 0.70 x10*3/uL Final   04/22/2024 09:35 AM 0.01 0.00 - 0.70 x10*3/uL Final     Lymphocytes Absolute   Date/Time Value Ref Range Status   02/06/2025 09:47 AM 1.64 1.20 - 4.80 x10*3/uL Final   08/14/2024 08:23 AM 1.64  1.20 - 4.80 x10*3/uL Final   04/22/2024 09:35 AM 1.19 (L) 1.20 - 4.80 x10*3/uL Final     Monocytes Absolute   Date/Time Value Ref Range Status   02/06/2025 09:47 AM 0.50 0.10 - 1.00 x10*3/uL Final   08/14/2024 08:23 AM 0.55 0.10 - 1.00 x10*3/uL Final   04/22/2024 09:35 AM 0.53 0.10 - 1.00 x10*3/uL Final     Eosinophils Absolute   Date/Time Value Ref Range Status   02/06/2025 09:47 AM 0.20 0.00 - 0.70 x10*3/uL Final   08/14/2024 08:23 AM 0.23 0.00 - 0.70 x10*3/uL Final   04/22/2024 09:35 AM 0.15 0.00 - 0.70 x10*3/uL Final     Basophils Absolute   Date/Time Value Ref Range Status   02/06/2025 09:47 AM 0.05 0.00 - 0.10 x10*3/uL Final   08/14/2024 08:23 AM 0.04 0.00 - 0.10 x10*3/uL Final   04/22/2024 09:35 AM 0.02 0.00 - 0.10 x10*3/uL Final       Assessment/Plan    Elevated ferritan 306, repeat 340.  He does not have hemachromatosis.  He is likely having inflammatory effect and elevated ferritan is sometimes seen with iron deficiency.  The % saturation is low and TIBC is high indicating his iron levels are actually low and his body is trying to bind as much iron as possible.  Labs today are pending, he is taking Flintstones oral iron and should increase to BID  B12 was low at 326, is taking liquid B12 1000mcg SL every day will help fatigue and improve energy as well as improve anemia.  Follow up higher B12, decrease to TIW  Anemia likely related to low B12 and iron deficiency.  He had normal colonoscopy.  It is standard of care for patient with iron deficiency and particularly with family member with Lopez related cancer.   Father with kidney cancer 50yo, no other significant family history           Diagnoses and all orders for this visit:  Other specified nutritional anemias  -     CBC and Auto Differential; Future  -     Ferritin; Future  -     Iron and TIBC; Future  -     Vitamin B12; Future  Iron deficiency anemia secondary to inadequate dietary iron intake  -     Clinic Appointment Request Follow up  Anemia,  unspecified type  -     Clinic Appointment Request Follow up  -     Trey-Barr Virus Antibody Panel; Future  -     CBC and Auto Differential; Future  -     Ferritin; Future  -     Iron and TIBC; Future  -     Vitamin B12; Future  -     Clinic Appointment Request Follow up; Future           Nahomi Cunningham PA-C

## 2025-02-27 RX ORDER — ATORVASTATIN CALCIUM 80 MG/1
80 TABLET, FILM COATED ORAL NIGHTLY
Qty: 90 TABLET | Refills: 0 | OUTPATIENT
Start: 2025-02-27

## 2025-03-03 ENCOUNTER — APPOINTMENT (OUTPATIENT)
Facility: CLINIC | Age: 50
End: 2025-03-03
Payer: COMMERCIAL

## 2025-03-03 DIAGNOSIS — M25.511 RIGHT SHOULDER PAIN, UNSPECIFIED CHRONICITY: ICD-10-CM

## 2025-03-07 ENCOUNTER — APPOINTMENT (OUTPATIENT)
Dept: ORTHOPEDIC SURGERY | Facility: CLINIC | Age: 50
End: 2025-03-07
Payer: COMMERCIAL

## 2025-03-17 ENCOUNTER — APPOINTMENT (OUTPATIENT)
Facility: CLINIC | Age: 50
End: 2025-03-17
Payer: COMMERCIAL

## 2025-03-17 ENCOUNTER — HOSPITAL ENCOUNTER (OUTPATIENT)
Dept: RADIOLOGY | Facility: CLINIC | Age: 50
Discharge: HOME | End: 2025-03-17
Payer: COMMERCIAL

## 2025-03-17 VITALS — WEIGHT: 240 LBS | HEIGHT: 69 IN | BODY MASS INDEX: 35.55 KG/M2

## 2025-03-17 DIAGNOSIS — M25.511 RIGHT SHOULDER PAIN, UNSPECIFIED CHRONICITY: ICD-10-CM

## 2025-03-17 DIAGNOSIS — M25.512 BILATERAL SHOULDER PAIN, UNSPECIFIED CHRONICITY: ICD-10-CM

## 2025-03-17 DIAGNOSIS — M19.011 ARTHRITIS OF RIGHT SHOULDER REGION: Primary | ICD-10-CM

## 2025-03-17 DIAGNOSIS — G89.29 CHRONIC RIGHT SHOULDER PAIN: ICD-10-CM

## 2025-03-17 DIAGNOSIS — M75.111 PARTIAL NONTRAUMATIC TEAR OF RIGHT ROTATOR CUFF: ICD-10-CM

## 2025-03-17 DIAGNOSIS — M25.511 BILATERAL SHOULDER PAIN, UNSPECIFIED CHRONICITY: ICD-10-CM

## 2025-03-17 DIAGNOSIS — M75.42 CORACOID IMPINGEMENT OF LEFT SHOULDER: ICD-10-CM

## 2025-03-17 DIAGNOSIS — M25.511 CHRONIC RIGHT SHOULDER PAIN: ICD-10-CM

## 2025-03-17 DIAGNOSIS — M75.112 PARTIAL NONTRAUMATIC TEAR OF LEFT ROTATOR CUFF: ICD-10-CM

## 2025-03-17 DIAGNOSIS — M25.811 IMPINGEMENT OF RIGHT SHOULDER: ICD-10-CM

## 2025-03-17 PROCEDURE — 1036F TOBACCO NON-USER: CPT

## 2025-03-17 PROCEDURE — 73030 X-RAY EXAM OF SHOULDER: CPT | Mod: RT

## 2025-03-17 PROCEDURE — 20610 DRAIN/INJ JOINT/BURSA W/O US: CPT

## 2025-03-17 PROCEDURE — 3008F BODY MASS INDEX DOCD: CPT

## 2025-03-17 PROCEDURE — 73030 X-RAY EXAM OF SHOULDER: CPT | Mod: RIGHT SIDE | Performed by: RADIOLOGY

## 2025-03-17 PROCEDURE — 99204 OFFICE O/P NEW MOD 45 MIN: CPT

## 2025-03-17 RX ORDER — TRIAMCINOLONE ACETONIDE 40 MG/ML
40 INJECTION, SUSPENSION INTRA-ARTICULAR; INTRAMUSCULAR
Status: COMPLETED | OUTPATIENT
Start: 2025-03-17 | End: 2025-03-17

## 2025-03-17 RX ORDER — NAPROXEN 500 MG/1
500 TABLET ORAL 2 TIMES DAILY PRN
Qty: 60 TABLET | Refills: 0 | Status: SHIPPED | OUTPATIENT
Start: 2025-03-17 | End: 2025-04-16

## 2025-03-17 RX ORDER — LIDOCAINE HYDROCHLORIDE 5 MG/ML
4 INJECTION, SOLUTION INFILTRATION; PERINEURAL
Status: COMPLETED | OUTPATIENT
Start: 2025-03-17 | End: 2025-03-17

## 2025-03-17 RX ADMIN — LIDOCAINE HYDROCHLORIDE 4 ML: 5 INJECTION, SOLUTION INFILTRATION; PERINEURAL at 09:46

## 2025-03-17 RX ADMIN — TRIAMCINOLONE ACETONIDE 40 MG: 40 INJECTION, SUSPENSION INTRA-ARTICULAR; INTRAMUSCULAR at 09:46

## 2025-03-17 ASSESSMENT — PAIN - FUNCTIONAL ASSESSMENT: PAIN_FUNCTIONAL_ASSESSMENT: NO/DENIES PAIN

## 2025-03-17 NOTE — PROGRESS NOTES
History of Present Illness:    50 y.o. male presents to clinic today for bilateral shoulder pain.  Patient reports that his right shoulder is more severe than left.  He has had right shoulder pain for over 5 years that has gradually gotten worse with more time.  He states the left shoulder started bothering him just recently.  He states it has been the last few months he started noted anterior shoulder pain in the left and right shoulder.  He has had a previous cortisone injection of the right shoulder in the past that did help his pain for a long time but has not had anything in the last year.  He has never had any injections into the left shoulder.  He states both shoulders have anterior shoulder pain that can be both a can Sharpey.  He does not note any significant loss of range of motion in either shoulder.  Does not endorse any significant weakness in either arm.  He denies any neck pain or any radicular symptoms today.  He takes Tylenol daily for both shoulder pain but mostly for the right.  He occasionally has some pain at night with the right shoulder that wakes him up.  He has not had surgery on either shoulder previously.      Review of Systems:    GENERAL: Negative  GI: Negative  MUSCULOSKELETAL: See HPI  SKIN: Negative  NEURO:  Negative     Physical Exam:    Shoulder:  Examination of right shoulder demonstrates no tenderness to palpation to the sternoclavicular joint. No tenderness to the AC Joint. Range of motion is 125 degrees degrees of flexion abduction, 50 degrees of external rotation, and sacrum of internal rotation.  Negative biceps ,  Postive Shanae Hendrickson.  Positive Jobes testing with pain and mild weakness.     Examination of left shoulder demonstrates no tenderness to palpation to the sternoclavicular joint. No tenderness to the AC Joint. Range of motion is 130 degrees degrees of flexion abduction, 60 degrees of external rotation, and L5 of internal rotation.  Negative biceps ,  Postive  "Shanae Hendrickson.  Positive Jobes testing with pain and mild weakness.     Elbow and wrist motion were not irritable.  Radial pulse 2+ and palpable. SILT. UE is NVI.    Imaging:  X-rays of the patient were ordered by Tere Medina PA-C and obtained today.  Tere Medina PA-C personally reviewed the results of the x-rays.    In addition, Tere Medina PA-C independently interpreted the patient's x-rays (performed by the Radiology department) by viewing the x-ray images and this is Tere Medina PA-C's personal interpretation:       X-rays of the right shoulder demonstrates significant narrowing to the glenohumeral joint space consistent with severe arthritis.  No evidence of erosion noted today.  No acute fractures or dislocations noted.    No new x-rays of the left shoulder were obtained today.  Previous x-rays back in 2021 were reviewed that demonstrated no significant joint arthritis or AC joint arthritis.  No acute fractures or dislocations noted on his x-rays at that time.    Assessment:   Right shoulder pain related to glenohumeral arthritis  Left shoulder pain related to impingement syndrome.    Plan:  We had a long discussion in regards to the patient's shoulder pain.  The differential diagnosis of the patient's shoulder pain include: shoulder impingement, rotator cuff tendinopathy, rotator cuff tearing, and biceps tendinopathy as all being potential sources of the pain.  There are numerous non-operative and operative treatment options for each of these conditions.      We will start off by treating the patient's shoulder conservatively (AKA non-operatively).  We gave the patient a prescription for physical therapy to work on increasing the range of motion and strength in the shoulder.   We also gave the patient a \"home exercise protocol\" list of exercises that they could work on to strengthen their shoulder at home.  We also called in a prescription for anti-inflammatories for the patient.  The patient was " informed that there are rare risks of using nonsteroidal antiinflammatory (NSAID) medications.  Risks of NSAIDS include, but are not limited to, upset stomach, ulcers in the stomach and other places in the gastrointestinal tract, and a mild increase in cardiovascular risk as a result of the antiinflammatory medications.  In addition, there is an increased risk in bleeding as a result of the medications.  The patient was advised to stop taking the NSAIDs if they cause them to have an upset stomach.  The patient was instructed to take the medication on a p.r.n. basis as needed only.  NSAIDs are not supposed to be taken every day for more than a few weeks.  If they have any questions or problems with the antiinflammatory medications, they should stop taking the medication immediately and call the office.     We offered the patient an injection of steroids into bilateral shoulders today.  I explained to the patient that there are some rare risks of steroid injections.       Rare risks of steroid injections into the shoulder include pain at the site of the injection, local swelling, irritation from the injection or the skin spray, local discoloration of the skin, risk of bleeding, and a risk of shoulder infection.  If the patient does have a flareup of pain in the evening following the injection, they should ice the shoulder 15 minutes at a time 3 times a day for up to 3 days.  If the pain gets too severe, they should go to a local Emergency Room right away.       The patient decided to proceed with the injections.  After sterilely prepping the posterior aspect of the bilateral shoulder joints with Betadyne, 5 mL of 0.25% Marcaine and 1 mL of Kenalog 40 mg.  The left shoulder was  injected into the subacromial bursa from a posterior approach.  The right shoulder was injected into the glenohumeral joints space from a posterior approach. There were no complications.  A bandage was applied over the site of the injection.  The patient tolerated the procedure well.     We will see the patient back in 6-8 weeks to reevaluate their shoulder pain. If they are still having pain at that time, we would then need to order a MRI to look for possible rotator cuff tears or biceps tearing in the shoulder.  The patient should call the office during business hours (9am-3pm; Monday - Friday)  with any questions or problems.  If the patient has any urgent issues outside of business hours, they should go to a local Emergency Room.       Patient ID: Thomas Maloly is a 50 y.o. male.    L Inj/Asp: R glenohumeral on 3/17/2025 9:46 AM  Indications: pain  Details: 22 G needle, posterior approach  Medications: 40 mg triamcinolone acetonide 40 mg/mL; 4 mL lidocaine 5 mg/mL (0.5 %)  Outcome: tolerated well, no immediate complications  Procedure, treatment alternatives, risks and benefits explained, specific risks discussed. Consent was given by the patient. Immediately prior to procedure a time out was called to verify the correct patient, procedure, equipment, support staff and site/side marked as required. Patient was prepped and draped in the usual sterile fashion.       L Inj/Asp: L subacromial bursa on 3/17/2025 9:46 AM  Indications: pain  Details: 22 G needle, posterior approach  Medications: 40 mg triamcinolone acetonide 40 mg/mL; 4 mL lidocaine 5 mg/mL (0.5 %)  Outcome: tolerated well, no immediate complications  Procedure, treatment alternatives, risks and benefits explained, specific risks discussed. Consent was given by the patient. Immediately prior to procedure a time out was called to verify the correct patient, procedure, equipment, support staff and site/side marked as required. Patient was prepped and draped in the usual sterile fashion.

## 2025-03-25 DIAGNOSIS — I10 PRIMARY HYPERTENSION: ICD-10-CM

## 2025-03-27 ENCOUNTER — APPOINTMENT (OUTPATIENT)
Dept: ENDOCRINOLOGY | Facility: CLINIC | Age: 50
End: 2025-03-27
Payer: COMMERCIAL

## 2025-03-27 ENCOUNTER — TELEPHONE (OUTPATIENT)
Dept: ENDOCRINOLOGY | Facility: CLINIC | Age: 50
End: 2025-03-27

## 2025-03-27 NOTE — TELEPHONE ENCOUNTER
Called and left message regarding his no show today at 4pm with . Made him aware of our no show policy and told to call to reschedule.

## 2025-04-01 ENCOUNTER — TELEPHONE (OUTPATIENT)
Dept: ENDOCRINOLOGY | Facility: CLINIC | Age: 50
End: 2025-04-01
Payer: COMMERCIAL

## 2025-04-01 DIAGNOSIS — E89.0 POSTABLATIVE HYPOTHYROIDISM: ICD-10-CM

## 2025-04-01 RX ORDER — LEVOTHYROXINE SODIUM 137 UG/1
137 TABLET ORAL
Qty: 90 TABLET | Refills: 1 | Status: SHIPPED | OUTPATIENT
Start: 2025-04-01

## 2025-04-11 RX ORDER — AMLODIPINE BESYLATE 5 MG/1
5 TABLET ORAL DAILY
Qty: 90 TABLET | Refills: 0 | OUTPATIENT
Start: 2025-04-11

## 2025-04-11 RX ORDER — LISINOPRIL 40 MG/1
40 TABLET ORAL DAILY
Qty: 90 TABLET | Refills: 0 | OUTPATIENT
Start: 2025-04-11

## 2025-04-17 ENCOUNTER — TELEPHONE (OUTPATIENT)
Dept: PRIMARY CARE | Facility: CLINIC | Age: 50
End: 2025-04-17
Payer: COMMERCIAL

## 2025-04-17 DIAGNOSIS — I10 PRIMARY HYPERTENSION: ICD-10-CM

## 2025-04-17 RX ORDER — AMLODIPINE BESYLATE 5 MG/1
5 TABLET ORAL DAILY
Qty: 90 TABLET | Refills: 0 | OUTPATIENT
Start: 2025-04-17

## 2025-04-18 DIAGNOSIS — I10 PRIMARY HYPERTENSION: ICD-10-CM

## 2025-04-18 RX ORDER — AMLODIPINE BESYLATE 5 MG/1
5 TABLET ORAL DAILY
Qty: 30 TABLET | Refills: 0 | Status: SHIPPED | OUTPATIENT
Start: 2025-04-18

## 2025-04-18 NOTE — TELEPHONE ENCOUNTER
Medication Name: Amlodipine   Dose: 5 mg tablet   Frequency: Take 1 tablet by mouth once daily   Quantity left: 6 days   Pharmacy: [facility] [place]     Last appointment: 10/4/24  Last CPE:  Last MCW:  Next appointment: 5/28/25  Next CPE:  Next MCW:

## 2025-04-28 DIAGNOSIS — I10 PRIMARY HYPERTENSION: ICD-10-CM

## 2025-04-29 RX ORDER — LISINOPRIL 40 MG/1
40 TABLET ORAL DAILY
Qty: 30 TABLET | Refills: 0 | Status: SHIPPED | OUTPATIENT
Start: 2025-04-29

## 2025-05-01 NOTE — PROGRESS NOTES
HPI   51 yo s/p I 131 for Graves's disease (31mCi in 2012) here in yearly follow up.      -taking levothyroxine 137mcg every day  -euthyroid without obstructive sx  -no eye sx          Current Outpatient Medications:     amLODIPine (Norvasc) 5 mg tablet, Take 1 tablet (5 mg) by mouth once daily., Disp: 30 tablet, Rfl: 0    atorvastatin (Lipitor) 80 mg tablet, Take 1 tablet (80 mg) by mouth once daily at bedtime., Disp: 90 tablet, Rfl: 0    buPROPion XL (Wellbutrin XL) 300 mg 24 hr tablet, , Disp: , Rfl:     fish oil (Omega-3) 60- mg capsule, Take 2 capsules (1,000 mg) by mouth 2 times a day., Disp: 1 capsule, Rfl: 0    gemfibrozil (Lopid) 600 mg tablet, Take 1 tablet (600 mg) by mouth 2 times a day., Disp: 180 tablet, Rfl: 1    levothyroxine (Synthroid, Levoxyl) 137 mcg tablet, Take 1 tablet (137 mcg) by mouth once daily in the morning. Take before meals., Disp: 90 tablet, Rfl: 1    lisinopril 40 mg tablet, Take 1 tablet by mouth once daily, Disp: 30 tablet, Rfl: 0    cholecalciferol (Vitamin D-3) 25 MCG (1000 UT) capsule, Take 1 capsule (25 mcg) by mouth once daily., Disp: , Rfl:     traZODone (Desyrel) 100 mg tablet, 1 tablet (100 mg) 2 times a day., Disp: , Rfl:     Vitamin D3 25 mcg (1,000 unit) capsule, Take 1 capsule (25 mcg) by mouth once daily., Disp: , Rfl:       Allergies as of 05/05/2025 - Reviewed 05/05/2025   Allergen Reaction Noted    Hydrocodone-acetaminophen Itching and Rash 04/22/2012    Pregabalin Hives 10/31/2017         Review of Systems   Cardiology: Lightheadedness-denies.  Chest pain-denies.  Leg edema-denies.  Palpitations-denies.  Respiratory: Cough-denies. Shortness of breath-denies.  Wheezing-denies.  Gastroenterology: Constipation-denies.  Diarrhea-denies.  Heartburn-denies.  Endocrinology: Cold intolerance-denies.  Heat intolerance-denies.  Sweats-denies.  Neurology: Headache-denies.  Tremor-denies.  Neuropathy in extremities-denies.  Psychology: Low energy-denies.   "Irritability-denies.  Sleep disturbances-denies.      /79 (BP Location: Left arm, Patient Position: Sitting)   Pulse 70   Ht 1.753 m (5' 9\")   Wt 115 kg (253 lb 9.6 oz)   BMI 37.45 kg/m²       Labs:  Lab Results   Component Value Date    WBC 5.4 02/06/2025    NRBC 0.0 02/06/2025    RBC 3.89 (L) 02/06/2025    HGB 12.0 (L) 02/06/2025    HCT 36.3 (L) 02/06/2025     02/06/2025     Lab Results   Component Value Date    CALCIUM 10.2 02/06/2025    AST 40 (H) 08/14/2024    ALKPHOS 38 08/14/2024    BILITOT 0.4 08/14/2024    PROT 8.0 08/14/2024    ALBUMIN 5.2 (H) 08/14/2024     02/06/2025    K 4.9 02/06/2025     02/06/2025    CO2 26 02/06/2025    ANIONGAP 12 02/06/2025    BUN 22 02/06/2025    CREATININE 1.35 (H) 02/06/2025    GLUCOSE 102 (H) 02/06/2025    ALT 69 (H) 08/14/2024    EGFR 64 02/06/2025     Lab Results   Component Value Date    CHOL 177 02/06/2025    TRIG 82 02/06/2025    HDL 75 02/06/2025    LDLCALC 85 02/06/2025     No results found for: \"MICROALBCREA\"  Lab Results   Component Value Date    TSH 3.77 08/14/2024     Lab Results   Component Value Date    SLODUUBX14 1,255 (H) 02/06/2025     Lab Results   Component Value Date    HGBA1C 5.4 03/04/2020         Physical Exam   General Appearance: pleasant, cooperative, no acute distress  HEENT: no chemosis, no proptosis, no lid lag, no lid retraction  Neck: no lymphadenopathy, no thyromegaly, no dominant thyroid nodules  Heart: no murmurs, regular rate and rhythm, S1 and S2  Lungs: no wheezes, no rhonci, no rales  Extremities: no lower extremity swelling      Assessment/Plan   1. Postablative hypothyroidism for Graves' disease  -euthyroid on therapy  -follow yearly with pcp as pt has been stable and can see endo prn             Follow Up:  Quinten garcia, suggest having tsh yearly and refills with pcp    -labs/tests/notes reviewed  -reviewed and counseled patient on medication monitoring and side effects          "

## 2025-05-05 ENCOUNTER — APPOINTMENT (OUTPATIENT)
Dept: ENDOCRINOLOGY | Facility: CLINIC | Age: 50
End: 2025-05-05
Payer: COMMERCIAL

## 2025-05-05 VITALS
SYSTOLIC BLOOD PRESSURE: 133 MMHG | WEIGHT: 253.6 LBS | DIASTOLIC BLOOD PRESSURE: 79 MMHG | BODY MASS INDEX: 37.56 KG/M2 | HEART RATE: 70 BPM | HEIGHT: 69 IN

## 2025-05-05 DIAGNOSIS — Z86.39 HISTORY OF GRAVES' DISEASE: ICD-10-CM

## 2025-05-05 DIAGNOSIS — E89.0 POSTABLATIVE HYPOTHYROIDISM: Primary | ICD-10-CM

## 2025-05-05 PROCEDURE — 99213 OFFICE O/P EST LOW 20 MIN: CPT | Performed by: INTERNAL MEDICINE

## 2025-05-05 PROCEDURE — 3075F SYST BP GE 130 - 139MM HG: CPT | Performed by: INTERNAL MEDICINE

## 2025-05-05 PROCEDURE — 3078F DIAST BP <80 MM HG: CPT | Performed by: INTERNAL MEDICINE

## 2025-05-05 PROCEDURE — 3008F BODY MASS INDEX DOCD: CPT | Performed by: INTERNAL MEDICINE

## 2025-05-05 ASSESSMENT — ENCOUNTER SYMPTOMS
OCCASIONAL FEELINGS OF UNSTEADINESS: 0
LOSS OF SENSATION IN FEET: 0
DEPRESSION: 0

## 2025-05-05 ASSESSMENT — PATIENT HEALTH QUESTIONNAIRE - PHQ9
SUM OF ALL RESPONSES TO PHQ9 QUESTIONS 1 & 2: 0
1. LITTLE INTEREST OR PLEASURE IN DOING THINGS: NOT AT ALL
2. FEELING DOWN, DEPRESSED OR HOPELESS: NOT AT ALL

## 2025-05-05 ASSESSMENT — PAIN SCALES - GENERAL: PAINLEVEL_OUTOF10: 0-NO PAIN

## 2025-05-22 DIAGNOSIS — E78.5 DYSLIPIDEMIA: ICD-10-CM

## 2025-05-22 DIAGNOSIS — Z12.5 PROSTATE CANCER SCREENING: ICD-10-CM

## 2025-05-22 DIAGNOSIS — I10 PRIMARY HYPERTENSION: Primary | ICD-10-CM

## 2025-05-22 DIAGNOSIS — R73.09 ELEVATED GLUCOSE: ICD-10-CM

## 2025-05-23 DIAGNOSIS — I10 PRIMARY HYPERTENSION: ICD-10-CM

## 2025-05-23 RX ORDER — AMLODIPINE BESYLATE 5 MG/1
5 TABLET ORAL DAILY
Qty: 30 TABLET | Refills: 0 | OUTPATIENT
Start: 2025-05-23

## 2025-05-26 DIAGNOSIS — I10 PRIMARY HYPERTENSION: ICD-10-CM

## 2025-05-27 ENCOUNTER — TELEPHONE (OUTPATIENT)
Facility: CLINIC | Age: 50
End: 2025-05-27
Payer: COMMERCIAL

## 2025-05-27 DIAGNOSIS — E89.0 POSTABLATIVE HYPOTHYROIDISM: Primary | ICD-10-CM

## 2025-05-28 ENCOUNTER — APPOINTMENT (OUTPATIENT)
Dept: PRIMARY CARE | Facility: CLINIC | Age: 50
End: 2025-05-28
Payer: COMMERCIAL

## 2025-05-28 VITALS
HEIGHT: 69 IN | WEIGHT: 253.4 LBS | SYSTOLIC BLOOD PRESSURE: 150 MMHG | BODY MASS INDEX: 37.53 KG/M2 | OXYGEN SATURATION: 98 % | HEART RATE: 73 BPM | RESPIRATION RATE: 16 BRPM | DIASTOLIC BLOOD PRESSURE: 82 MMHG

## 2025-05-28 DIAGNOSIS — M67.431 GANGLION CYST OF WRIST, RIGHT: ICD-10-CM

## 2025-05-28 DIAGNOSIS — R14.0 ABDOMINAL BLOATING: ICD-10-CM

## 2025-05-28 DIAGNOSIS — I10 PRIMARY HYPERTENSION: ICD-10-CM

## 2025-05-28 DIAGNOSIS — E78.2 MIXED HYPERLIPIDEMIA: ICD-10-CM

## 2025-05-28 DIAGNOSIS — Z00.00 ANNUAL PHYSICAL EXAM: Primary | ICD-10-CM

## 2025-05-28 PROBLEM — R63.4 WEIGHT LOSS: Status: RESOLVED | Noted: 2024-06-04 | Resolved: 2025-05-28

## 2025-05-28 PROBLEM — F12.90 CURRENT CANNABIS VAPING ON SOME DAYS: Status: RESOLVED | Noted: 2024-06-13 | Resolved: 2025-05-28

## 2025-05-28 PROBLEM — R19.7 INTERMITTENT DIARRHEA: Status: RESOLVED | Noted: 2024-06-04 | Resolved: 2025-05-28

## 2025-05-28 LAB
ALT SERPL-CCNC: 40 U/L (ref 9–46)
ANION GAP SERPL CALCULATED.4IONS-SCNC: 13 MMOL/L (CALC) (ref 7–17)
APPEARANCE UR: CLEAR
AST SERPL-CCNC: 33 U/L (ref 10–35)
BACTERIA #/AREA URNS HPF: NORMAL /HPF
BACTERIA UR CULT: NORMAL
BILIRUB UR QL STRIP: NEGATIVE
BUN SERPL-MCNC: 20 MG/DL (ref 7–25)
BUN/CREAT SERPL: NORMAL (CALC) (ref 6–22)
CALCIUM SERPL-MCNC: 10 MG/DL (ref 8.6–10.3)
CHLORIDE SERPL-SCNC: 101 MMOL/L (ref 98–110)
CHOLEST SERPL-MCNC: 219 MG/DL
CHOLEST/HDLC SERPL: 3.3 (CALC)
CO2 SERPL-SCNC: 26 MMOL/L (ref 20–32)
COLOR UR: YELLOW
CREAT SERPL-MCNC: 1.1 MG/DL (ref 0.7–1.3)
EGFRCR SERPLBLD CKD-EPI 2021: 81 ML/MIN/1.73M2
EST. AVERAGE GLUCOSE BLD GHB EST-MCNC: 111 MG/DL
EST. AVERAGE GLUCOSE BLD GHB EST-SCNC: 6.2 MMOL/L
GLUCOSE SERPL-MCNC: 91 MG/DL (ref 65–99)
GLUCOSE UR QL STRIP: NEGATIVE
HBA1C MFR BLD: 5.5 %
HDLC SERPL-MCNC: 67 MG/DL
HGB UR QL STRIP: NEGATIVE
HYALINE CASTS #/AREA URNS LPF: NORMAL /LPF
KETONES UR QL STRIP: NEGATIVE
LDLC SERPL CALC-MCNC: 117 MG/DL (CALC)
LEUKOCYTE ESTERASE UR QL STRIP: NEGATIVE
NITRITE UR QL STRIP: NEGATIVE
NONHDLC SERPL-MCNC: 152 MG/DL (CALC)
PH UR STRIP: 5.5 [PH] (ref 5–8)
POTASSIUM SERPL-SCNC: 4.5 MMOL/L (ref 3.5–5.3)
PROT UR QL STRIP: NEGATIVE
PSA SERPL-MCNC: 0.33 NG/ML
RBC #/AREA URNS HPF: NORMAL /HPF
SERVICE CMNT-IMP: NORMAL
SODIUM SERPL-SCNC: 140 MMOL/L (ref 135–146)
SP GR UR STRIP: 1.02 (ref 1–1.03)
SQUAMOUS #/AREA URNS HPF: NORMAL /HPF
T3FREE SERPL-MCNC: 2.5 PG/ML (ref 2.3–4.2)
T4 FREE SERPL-MCNC: 1.1 NG/DL (ref 0.8–1.8)
TRIGL SERPL-MCNC: 232 MG/DL
TSH SERPL-ACNC: 0.46 MIU/L (ref 0.4–4.5)
WBC #/AREA URNS HPF: NORMAL /HPF

## 2025-05-28 PROCEDURE — 1036F TOBACCO NON-USER: CPT | Performed by: FAMILY MEDICINE

## 2025-05-28 PROCEDURE — 99396 PREV VISIT EST AGE 40-64: CPT | Performed by: FAMILY MEDICINE

## 2025-05-28 PROCEDURE — 3008F BODY MASS INDEX DOCD: CPT | Performed by: FAMILY MEDICINE

## 2025-05-28 PROCEDURE — 3079F DIAST BP 80-89 MM HG: CPT | Performed by: FAMILY MEDICINE

## 2025-05-28 PROCEDURE — 99214 OFFICE O/P EST MOD 30 MIN: CPT | Performed by: FAMILY MEDICINE

## 2025-05-28 PROCEDURE — 3077F SYST BP >= 140 MM HG: CPT | Performed by: FAMILY MEDICINE

## 2025-05-28 RX ORDER — ROSUVASTATIN CALCIUM 40 MG/1
40 TABLET, COATED ORAL DAILY
Qty: 90 TABLET | Refills: 1 | Status: SHIPPED | OUTPATIENT
Start: 2025-05-28

## 2025-05-28 RX ORDER — LISINOPRIL 40 MG/1
40 TABLET ORAL DAILY
Qty: 90 TABLET | Refills: 1 | Status: SHIPPED | OUTPATIENT
Start: 2025-05-28

## 2025-05-28 RX ORDER — AMLODIPINE BESYLATE 10 MG/1
10 TABLET ORAL DAILY
Qty: 90 TABLET | Refills: 1 | Status: SHIPPED | OUTPATIENT
Start: 2025-05-28

## 2025-05-28 RX ORDER — GEMFIBROZIL 600 MG/1
600 TABLET, FILM COATED ORAL 2 TIMES DAILY
Qty: 180 TABLET | Refills: 1 | Status: SHIPPED | OUTPATIENT
Start: 2025-05-28

## 2025-05-28 ASSESSMENT — PATIENT HEALTH QUESTIONNAIRE - PHQ9
1. LITTLE INTEREST OR PLEASURE IN DOING THINGS: NOT AT ALL
SUM OF ALL RESPONSES TO PHQ9 QUESTIONS 1 AND 2: 0
2. FEELING DOWN, DEPRESSED OR HOPELESS: NOT AT ALL

## 2025-05-28 ASSESSMENT — PROMIS GLOBAL HEALTH SCALE
RATE_GENERAL_HEALTH: GOOD
RATE_AVERAGE_PAIN: 4
RATE_SOCIAL_SATISFACTION: VERY GOOD
RATE_PHYSICAL_HEALTH: FAIR
RATE_MENTAL_HEALTH: VERY GOOD
RATE_AVERAGE_FATIGUE: MODERATE
CARRYOUT_PHYSICAL_ACTIVITIES: COMPLETELY
CARRYOUT_SOCIAL_ACTIVITIES: VERY GOOD
RATE_QUALITY_OF_LIFE: GOOD
EMOTIONAL_PROBLEMS: NEVER

## 2025-05-28 NOTE — PATIENT INSTRUCTIONS
Reviewed lab results (PSA still pending).  Increased Amlodipine.  Atorvastatin changed to Rosuvastatin.  Refilled other medications at current doses.  Recommend lipid lowering diet, decreasing trans fats and saturated fats (see healthyforgood.heart.org for ideas).     Ultrasound ordered as discussed.    Try otc Omeprazole or Famotidine.  Follow up with gastroenterology.    Hypertension recommendations:  DASH diet.  Decrease sodium intake to <1,500 mg/day.  Recommend weight loss efforts (see www.yourweightmatters.org/category/nutrition for ideas).  Recommend exercising (brisk walking, jogging, swimming, bicycling) 30 minutes/day, 5 days/week.  Stop smoking (if applicable).  Call Tobacco Quit Line (5-025-QUIT-NOW) for resources and support.  Decrease alcohol intake (if applicable).     Repeat fasting lipids in 3 months.    F/U 2-3 weeks: BP check.    Lab services: [address]  Hours: M-F 7:15a-6:00p  Phone: [phone], Option 1

## 2025-05-28 NOTE — PROGRESS NOTES
"Subjective   Patient ID: Thomas Malloy is a 50 y.o. male who presents for Annual Exam and Med Refill.    HPI   Patient's health is described as fair.  Regular dental visits: No (dentures).    Corrective lenses: Yes.  Vision problems: No.  Last eye exam within 1 year: Yes.  Hearing loss: No.  Requests audiology referral: No.  Immunizations up to date: No (declines shingrix, pcv20, MMR).  Healthy diet: No.  Regular exercise: No.  Trying to lose weight: Yes.  Requests nutrition/weight loss referral: No.  Sexually active: Yes.  Using contraception: No.  Requests STD screening: No.  Colon cancer screening up to date: Yes (2024, repeat in 10 yrs).  Lung cancer screening up to date: N/A.  Hepatitis C screening up to date: Yes.    Has HLD, HTN.  Condition(s) stable.  Taking med(s) as directed.  Requests refills.    Labs obtained prior to visit (PSA still pending).    Reviewed/Updated Active problem list, PMH, PSH, FH, SH, Meds, Allergies.    Review of Systems  Bump on right wrist x 1.5 months.  No pain.  No size change.  Feels full, even when he doesn't eat x 6-7 months.  +Belching, abd bloating.  No n/v/d/c.  Tried otc meds (does not know what he tried), but he does not think he tried Pepcid or Omeprazole.  Saw GI for this last year, had EGD and colonoscopy.    Objective   /82   Pulse 73   Resp 16   Ht 1.753 m (5' 9\")   Wt 115 kg (253 lb 6.4 oz)   SpO2 98%   BMI 37.42 kg/m²     Physical Exam  Constitutional:       General: He is not in acute distress.     Appearance: He is obese.   HENT:      Head: Normocephalic.      Right Ear: Tympanic membrane normal.      Left Ear: Tympanic membrane normal.      Mouth/Throat:      Pharynx: Oropharynx is clear. No oropharyngeal exudate or posterior oropharyngeal erythema.   Eyes:      Extraocular Movements: Extraocular movements intact.      Conjunctiva/sclera: Conjunctivae normal.      Pupils: Pupils are equal, round, and reactive to light.   Neck:      Thyroid: No " thyromegaly.      Vascular: No carotid bruit.   Cardiovascular:      Rate and Rhythm: Normal rate and regular rhythm.      Heart sounds: Normal heart sounds. No murmur heard.     No friction rub. No gallop.   Pulmonary:      Effort: Pulmonary effort is normal.      Breath sounds: Normal breath sounds. No wheezing, rhonchi or rales.   Abdominal:      General: Abdomen is flat. There is no distension.      Palpations: Abdomen is soft. There is no mass.      Tenderness: There is no abdominal tenderness. There is no guarding or rebound.   Genitourinary:     Prostate: No nodules present.   Musculoskeletal:      Comments: Superficial mobile nontender cyst right wrist.  No overlying skin changes.   Lymphadenopathy:      Cervical: No cervical adenopathy.   Skin:     Coloration: Skin is not jaundiced or pale.   Neurological:      General: No focal deficit present.      Mental Status: He is oriented to person, place, and time.   Psychiatric:         Mood and Affect: Mood normal.         Behavior: Behavior normal.     Assessment/Plan   Diagnoses and all orders for this visit:  Annual physical exam  Primary hypertension  -     amLODIPine (Norvasc) 10 mg tablet; Take 1 tablet (10 mg) by mouth once daily.  -     lisinopril 40 mg tablet; Take 1 tablet (40 mg) by mouth once daily.  Mixed hyperlipidemia  -     rosuvastatin (Crestor) 40 mg tablet; Take 1 tablet (40 mg) by mouth once daily.  -     gemfibrozil (Lopid) 600 mg tablet; Take 1 tablet (600 mg) by mouth 2 times a day.  -     Lipid Panel; Future  Ganglion cyst of wrist, right  -     US extremity nonvascular real time w image documentation limited anatomic specific; Future  Abdominal bloating    Reviewed lab results (PSA still pending).  Increased Amlodipine.  Atorvastatin changed to Rosuvastatin.  Refilled other medications at current doses.  Recommend lipid lowering diet, decreasing trans fats and saturated fats (see healthyforgood.heart.org for ideas).     Ultrasound ordered  as discussed.    Try otc Omeprazole or Famotidine.  Follow up with gastroenterology.    Hypertension recommendations:  DASH diet.  Decrease sodium intake to <1,500 mg/day.  Recommend weight loss efforts (see www.yourweightmatters.org/category/nutrition for ideas).  Recommend exercising (brisk walking, jogging, swimming, bicycling) 30 minutes/day, 5 days/week.  Stop smoking (if applicable).  Call Tobacco Quit Line (7-356-QUIT-NOW) for resources and support.  Decrease alcohol intake (if applicable).     Repeat fasting lipids in 3 months.    F/U 2-3 weeks: BP check.

## 2025-05-29 RX ORDER — LISINOPRIL 40 MG/1
40 TABLET ORAL DAILY
Qty: 30 TABLET | Refills: 0 | OUTPATIENT
Start: 2025-05-29

## 2025-06-20 ENCOUNTER — HOSPITAL ENCOUNTER (OUTPATIENT)
Dept: RADIOLOGY | Facility: CLINIC | Age: 50
Discharge: HOME | End: 2025-06-20
Payer: COMMERCIAL

## 2025-06-20 DIAGNOSIS — M67.431 GANGLION CYST OF WRIST, RIGHT: ICD-10-CM

## 2025-06-20 PROCEDURE — 76882 US LMTD JT/FCL EVL NVASC XTR: CPT | Mod: RT

## 2025-06-22 DIAGNOSIS — M67.431 GANGLION CYST OF WRIST, RIGHT: Primary | ICD-10-CM

## 2025-06-30 ENCOUNTER — APPOINTMENT (OUTPATIENT)
Dept: ORTHOPEDIC SURGERY | Facility: CLINIC | Age: 50
End: 2025-06-30
Payer: COMMERCIAL

## 2025-06-30 VITALS — WEIGHT: 250 LBS | BODY MASS INDEX: 37.03 KG/M2 | HEIGHT: 69 IN

## 2025-06-30 DIAGNOSIS — M67.431 GANGLION CYST OF WRIST, RIGHT: ICD-10-CM

## 2025-06-30 PROCEDURE — 99203 OFFICE O/P NEW LOW 30 MIN: CPT | Performed by: FAMILY MEDICINE

## 2025-06-30 PROCEDURE — 1036F TOBACCO NON-USER: CPT | Performed by: FAMILY MEDICINE

## 2025-06-30 PROCEDURE — 3008F BODY MASS INDEX DOCD: CPT | Performed by: FAMILY MEDICINE

## 2025-06-30 NOTE — LETTER
June 30, 2025     Cody Hines MD  [address], [address]  [place] [address]    Patient: Thomas Malloy   YOB: 1975   Date of Visit: 6/30/2025       Dear Dr. Cody Hines MD:    Thank you for referring Thomas Malloy to me for evaluation. Below are my notes for this consultation.  If you have questions, please do not hesitate to call me. I look forward to following your patient along with you.       Sincerely,     Etienne Feliciano MD      CC: No Recipients  ______________________________________________________________________________________      History of Present Illness   Chief Complaint   Patient presents with   • Right Wrist - Cyst     Nki  Cyst x a few months  -pain -swelling       The patient is 50 y.o. male  here with a complaint of right wrist volar cyst.  Patient referred by PCP, Cody Hines.  Atraumatic onset of symptoms a few months ago.  He points to the radial volar aspect of his wrist as area of bump/swelling, waxing and waning in size, says it will come and go.  PCP recently ordered [facility] ultrasound that showed a complex volar cyst, recommended outpatient orthopedic follow-up.  He denies any pain, he denies any issues with range of motion, just wanted to make sure thing was okay and if any other additional treatment was warranted.  No numbness or tingling.  No history of any similar cysts in the past.    Medical History[1]    Medication Documentation Review Audit       Reviewed by Cody Hines MD (Physician) on 05/28/25 at 0855      Medication Order Taking? Sig Documenting Provider Last Dose Status   amLODIPine (Norvasc) 5 mg tablet [id number]  Take 1 tablet (5 mg) by mouth once daily. Cody Hines MD  Active   atorvastatin (Lipitor) 80 mg tablet [id number]  Take 1 tablet (80 mg) by mouth once daily at bedtime. Cody Hines MD  Active   buPROPion XL (Wellbutrin XL) 300 mg 24 hr tablet [id number] No  Historical Provider, MD 6/12/2024  Active   cholecalciferol (Vitamin D-3) 25 MCG (1000 UT) capsule [id number] No Take 1 capsule (25 mcg) by mouth once daily. Historical Provider, MD 6/12/2024 Active   fish oil (Omega-3) 60- mg capsule [id number]  Take 2 capsules (1,000 mg) by mouth 2 times a day. Cody Hines MD  Active   gemfibrozil (Lopid) 600 mg tablet [id number]  Take 1 tablet (600 mg) by mouth 2 times a day. Cody Hines MD  Active   levothyroxine (Synthroid, Levoxyl) 137 mcg tablet [id number]  Take 1 tablet (137 mcg) by mouth once daily in the morning. Take before meals. Breezy Shipley MD  Active   lisinopril 40 mg tablet [id number]  Take 1 tablet by mouth once daily Cody Hines MD  Active   traZODone (Desyrel) 100 mg tablet [id number] No 1 tablet (100 mg) 2 times a day. Historical Provider, MD Past Week Active   Vitamin D3 25 mcg (1,000 unit) capsule [id number] No Take 1 capsule (25 mcg) by mouth once daily. Historical Provider, MD 6/12/2024 Active                    RX Allergies[2]    Social History     Socioeconomic History   • Marital status:      Spouse name: Not on file   • Number of children: Not on file   • Years of education: Not on file   • Highest education level: Not on file   Occupational History   • Not on file   Tobacco Use   • Smoking status: Never     Passive exposure: Never   • Smokeless tobacco: Never   • Tobacco comments:     None   Vaping Use   • Vaping status: Never Used   Substance and Sexual Activity   • Alcohol use: Yes     Alcohol/week: 8.0 standard drinks of alcohol     Types: 8 Cans of beer per week     Comment: every other week   • Drug use: Yes     Frequency: 7.0 times per week     Types: Marijuana     Comment: Vapes w/THC--medical marijuana card, 3-4 times a week   • Sexual activity: Yes     Partners: Female   Other Topics Concern   • Not on file   Social History Narrative   • Not on file     Social Drivers of Health     Financial Resource Strain: Not on file   Food Insecurity: No Food  Insecurity (8/14/2024)    Hunger Vital Sign    • Worried About Running Out of Food in the Last Year: Never true    • Ran Out of Food in the Last Year: Never true   Transportation Needs: Not on file   Physical Activity: Not on file   Stress: Not on file   Social Connections: Not on file   Intimate Partner Violence: Not on file   Housing Stability: Not on file       Surgical History[3]       Review of Systems   GENERAL: Negative  GI: Negative  MUSCULOSKELETAL: See HPI  SKIN: Negative  NEURO:  Negative     Physical Exam:    General/Constitutional: well appearing, no distress, appears stated age  HEENT: sclera clear  Respiratory: non labored breathing  Vascular: No edema, swelling or tenderness, except as noted in detailed exam.  Integumentary: No impressive skin lesions present, except as noted in detailed exam.  Neurological:  Alert and oriented   Psychological:  Normal mood and affect.  Musculoskeletal: Normal, except as noted in detailed exam and in HPI    Right wrist: There is a small palpable cystic structure over the volar radial aspect of the wrist more prominent with wrist and extended position, dissipates with wrist flexed.  There is no tenderness palpation here.  He has full range of motion in all directions without pain, there is no motor deficits, there is no wrist instability, the hand there is no motor or sensory deficits in the median, ulnar, radial nerve distributions.  2+ radial pulse.       Imaging: [facility] ultrasound of the right wrist was reviewed, there is a 0.7 cm complex cyst over the volar aspect of the wrist      Assessment   1. Ganglion cyst of wrist, right  Referral to Orthopedics and Sports Medicine            Plan: Symptoms consistent with ganglion cysts, patient is relatively asymptomatic, small in size, given location recommending conservative managements, explained that no treatment is necessary, if symptoms became more significant he developed pain or limited mobility he could consider  surgical excision with Dr. Lind, explained risks and benefits of this given location of cyst on the volar surface near the median artery.  All questions and concerns were answered.       [1]  Past Medical History:  Diagnosis Date   • Anxiety    • Cervical root disorders, not elsewhere classified     Cervical syndrome   • Depression    • Hypertension 3 yrs ago   • Hyperthyroidism 5 yrs ago   • Hypothyroidism 5 yrs ago   • Other conditions influencing health status     Displacement of intervertebral disc without myelopathy   • Other specified health status     No pertinent past surgical history   • Vitamin D deficiency 3 yrs ago   [2]  Allergies  Allergen Reactions   • Hydrocodone-Acetaminophen Itching and Rash   • Pregabalin Hives     Weight gain    Other reaction(s): Other: See Comments   [3]  Past Surgical History:  Procedure Laterality Date   • HERNIA REPAIR Bilateral     inguinal

## 2025-06-30 NOTE — PROGRESS NOTES
History of Present Illness   Chief Complaint   Patient presents with    Right Wrist - Cyst     Nki  Cyst x a few months  -pain -swelling       The patient is 50 y.o. male  here with a complaint of right wrist volar cyst.  Patient referred by PCP, Cody Hines.  Atraumatic onset of symptoms a few months ago.  He points to the radial volar aspect of his wrist as area of bump/swelling, waxing and waning in size, says it will come and go.  PCP recently ordered [facility] ultrasound that showed a complex volar cyst, recommended outpatient orthopedic follow-up.  He denies any pain, he denies any issues with range of motion, just wanted to make sure thing was okay and if any other additional treatment was warranted.  No numbness or tingling.  No history of any similar cysts in the past.    Medical History[1]    Medication Documentation Review Audit       Reviewed by Cody Hines MD (Physician) on 05/28/25 at 0855      Medication Order Taking? Sig Documenting Provider Last Dose Status   amLODIPine (Norvasc) 5 mg tablet [id number]  Take 1 tablet (5 mg) by mouth once daily. Cody Hines MD  Active   atorvastatin (Lipitor) 80 mg tablet [id number]  Take 1 tablet (80 mg) by mouth once daily at bedtime. Cody Hines MD  Active   buPROPion XL (Wellbutrin XL) 300 mg 24 hr tablet [id number] No  Historical Provider, MD 6/12/2024 Active   cholecalciferol (Vitamin D-3) 25 MCG (1000 UT) capsule [id number] No Take 1 capsule (25 mcg) by mouth once daily. Historical Provider, MD 6/12/2024 Active   fish oil (Omega-3) 60- mg capsule [id number]  Take 2 capsules (1,000 mg) by mouth 2 times a day. Cody Hines MD  Active   gemfibrozil (Lopid) 600 mg tablet [id number]  Take 1 tablet (600 mg) by mouth 2 times a day. Cody Hines MD  Active   levothyroxine (Synthroid, Levoxyl) 137 mcg tablet [id number]  Take 1 tablet (137 mcg) by mouth once daily in the morning. Take before meals. Breezy Shipley MD  Active   lisinopril 40 mg tablet  [id number]  Take 1 tablet by mouth once daily Cody Hines MD  Active   traZODone (Desyrel) 100 mg tablet [id number] No 1 tablet (100 mg) 2 times a day. Historical Provider, MD Past Week Active   Vitamin D3 25 mcg (1,000 unit) capsule [id number] No Take 1 capsule (25 mcg) by mouth once daily. Historical Provider, MD 6/12/2024 Active                    RX Allergies[2]    Social History     Socioeconomic History    Marital status:      Spouse name: Not on file    Number of children: Not on file    Years of education: Not on file    Highest education level: Not on file   Occupational History    Not on file   Tobacco Use    Smoking status: Never     Passive exposure: Never    Smokeless tobacco: Never    Tobacco comments:     None   Vaping Use    Vaping status: Never Used   Substance and Sexual Activity    Alcohol use: Yes     Alcohol/week: 8.0 standard drinks of alcohol     Types: 8 Cans of beer per week     Comment: every other week    Drug use: Yes     Frequency: 7.0 times per week     Types: Marijuana     Comment: Vapes w/THC--medical marijuana card, 3-4 times a week    Sexual activity: Yes     Partners: Female   Other Topics Concern    Not on file   Social History Narrative    Not on file     Social Drivers of Health     Financial Resource Strain: Not on file   Food Insecurity: No Food Insecurity (8/14/2024)    Hunger Vital Sign     Worried About Running Out of Food in the Last Year: Never true     Ran Out of Food in the Last Year: Never true   Transportation Needs: Not on file   Physical Activity: Not on file   Stress: Not on file   Social Connections: Not on file   Intimate Partner Violence: Not on file   Housing Stability: Not on file       Surgical History[3]       Review of Systems   GENERAL: Negative  GI: Negative  MUSCULOSKELETAL: See HPI  SKIN: Negative  NEURO:  Negative     Physical Exam:    General/Constitutional: well appearing, no distress, appears stated age  HEENT: sclera clear  Respiratory:  non labored breathing  Vascular: No edema, swelling or tenderness, except as noted in detailed exam.  Integumentary: No impressive skin lesions present, except as noted in detailed exam.  Neurological:  Alert and oriented   Psychological:  Normal mood and affect.  Musculoskeletal: Normal, except as noted in detailed exam and in HPI    Right wrist: There is a small palpable cystic structure over the volar radial aspect of the wrist more prominent with wrist and extended position, dissipates with wrist flexed.  There is no tenderness palpation here.  He has full range of motion in all directions without pain, there is no motor deficits, there is no wrist instability, the hand there is no motor or sensory deficits in the median, ulnar, radial nerve distributions.  2+ radial pulse.       Imaging: [facility] ultrasound of the right wrist was reviewed, there is a 0.7 cm complex cyst over the volar aspect of the wrist      Assessment   1. Ganglion cyst of wrist, right  Referral to Orthopedics and Sports Medicine            Plan: Symptoms consistent with ganglion cysts, patient is relatively asymptomatic, small in size, given location recommending conservative managements, explained that no treatment is necessary, if symptoms became more significant he developed pain or limited mobility he could consider surgical excision with Dr. Lind, explained risks and benefits of this given location of cyst on the volar surface near the median artery.  All questions and concerns were answered.       [1]   Past Medical History:  Diagnosis Date    Anxiety     Cervical root disorders, not elsewhere classified     Cervical syndrome    Depression     Hypertension 3 yrs ago    Hyperthyroidism 5 yrs ago    Hypothyroidism 5 yrs ago    Other conditions influencing health status     Displacement of intervertebral disc without myelopathy    Other specified health status     No pertinent past surgical history    Vitamin D deficiency 3 yrs ago   [2]    Allergies  Allergen Reactions    Hydrocodone-Acetaminophen Itching and Rash    Pregabalin Hives     Weight gain    Other reaction(s): Other: See Comments   [3]   Past Surgical History:  Procedure Laterality Date    HERNIA REPAIR Bilateral     inguinal

## 2025-08-27 ENCOUNTER — APPOINTMENT (OUTPATIENT)
Dept: HEMATOLOGY/ONCOLOGY | Facility: CLINIC | Age: 50
End: 2025-08-27
Payer: COMMERCIAL

## 2025-08-27 DIAGNOSIS — D53.8 OTHER SPECIFIED NUTRITIONAL ANEMIAS: Primary | ICD-10-CM

## 2025-08-28 DIAGNOSIS — E78.2 MIXED HYPERLIPIDEMIA: ICD-10-CM

## 2025-10-17 ENCOUNTER — APPOINTMENT (OUTPATIENT)
Facility: CLINIC | Age: 50
End: 2025-10-17
Payer: COMMERCIAL